# Patient Record
Sex: MALE | Race: WHITE | NOT HISPANIC OR LATINO | Employment: FULL TIME | URBAN - METROPOLITAN AREA
[De-identification: names, ages, dates, MRNs, and addresses within clinical notes are randomized per-mention and may not be internally consistent; named-entity substitution may affect disease eponyms.]

---

## 2017-08-18 ENCOUNTER — OFFICE VISIT (OUTPATIENT)
Dept: INTERNAL MEDICINE | Facility: CLINIC | Age: 37
End: 2017-08-18
Payer: COMMERCIAL

## 2017-08-18 VITALS
OXYGEN SATURATION: 96 % | HEART RATE: 78 BPM | BODY MASS INDEX: 33.41 KG/M2 | SYSTOLIC BLOOD PRESSURE: 118 MMHG | HEIGHT: 72 IN | WEIGHT: 246.69 LBS | DIASTOLIC BLOOD PRESSURE: 80 MMHG

## 2017-08-18 DIAGNOSIS — L40.9 PSORIASIS: ICD-10-CM

## 2017-08-18 DIAGNOSIS — R35.1 NOCTURIA: ICD-10-CM

## 2017-08-18 DIAGNOSIS — R79.89 LFT ELEVATION: ICD-10-CM

## 2017-08-18 DIAGNOSIS — E66.9 NON MORBID OBESITY, UNSPECIFIED OBESITY TYPE: ICD-10-CM

## 2017-08-18 DIAGNOSIS — Z00.00 PREVENTATIVE HEALTH CARE: ICD-10-CM

## 2017-08-18 DIAGNOSIS — S16.1XXA NECK STRAIN, INITIAL ENCOUNTER: Primary | ICD-10-CM

## 2017-08-18 PROCEDURE — 99999 PR PBB SHADOW E&M-EST. PATIENT-LVL III: CPT | Mod: PBBFAC,,, | Performed by: INTERNAL MEDICINE

## 2017-08-18 PROCEDURE — 3008F BODY MASS INDEX DOCD: CPT | Mod: S$GLB,,, | Performed by: INTERNAL MEDICINE

## 2017-08-18 PROCEDURE — 99214 OFFICE O/P EST MOD 30 MIN: CPT | Mod: S$GLB,,, | Performed by: INTERNAL MEDICINE

## 2017-08-18 RX ORDER — CYCLOBENZAPRINE HCL 10 MG
10 TABLET ORAL 2 TIMES DAILY PRN
Qty: 20 TABLET | Refills: 0 | Status: SHIPPED | OUTPATIENT
Start: 2017-08-18 | End: 2017-08-28

## 2017-08-18 NOTE — PROGRESS NOTES
Pt. ID: Gallito Wayne is a 37 y.o. male      Chief complaint:   Chief Complaint   Patient presents with    Torticollis     on/of x 3 mos       HPI:  Pt. Here for neck pain on and off for past 2 months; he denies injury; currently he has minimal pain with 1/10 pain; he has not taken anything for it; I reviewed labs dated 12/6/16; LFT's have normalized and hepatitis profile was negative; pt. Has gained a few pounds; he reports occassional nocturia and I advised him to limit fluid intake after 4 PM; he will come back fasting for labs and requests PSA; he has minimal psoriasis to L elbow and R knee which comes and goes; I advised him to start OTC hydrocortisone      Review of Systems   Constitutional: Negative for chills and fever.   HENT: Negative for congestion.    Respiratory: Negative for shortness of breath.    Cardiovascular: Negative for chest pain.   Gastrointestinal: Negative for abdominal pain, nausea and vomiting.   Genitourinary: Negative for dysuria.   Musculoskeletal: Positive for neck pain.        Posterior neck pain which flares on occasion; currently, minimal symptoms   Skin: Positive for rash.        intermitent rash to L elbow and R knee   Neurological: Negative for headaches.   Psychiatric/Behavioral: Negative for depression. The patient is not nervous/anxious.          Objective:    Physical Exam   Constitutional: He is oriented to person, place, and time.   obese   Eyes: EOM are normal.   Neck: Normal range of motion.   Posterior neck: good ROM with minimal pain on ROM   Cardiovascular: Normal rate, regular rhythm and normal heart sounds.    Pulmonary/Chest: Effort normal and breath sounds normal.   Abdominal: Soft. There is no tenderness. There is no rebound and no guarding.   Musculoskeletal: Normal range of motion.   Neurological: He is alert and oriented to person, place, and time.   Skin: Rash noted.   Dime size psoriatric rash to L elbow and silver dollar size area to R knee            Health Maintenance   Topic Date Due    Influenza Vaccine  08/01/2017    Lipid Panel  01/06/2021    TETANUS VACCINE  01/04/2026         Assessment:     1. Neck strain, initial encounter Well controlled   2. Nocturia Active   3. LFT elevation Well controlled   4. Psoriasis Active   5. Non morbid obesity, unspecified obesity type Sub-optimally controlled   6. Preventative health care Active         Plan: Gallito was seen today for torticollis.    Diagnoses and all orders for this visit:    Neck strain, initial encounter  Comments:  tylenol prn and start short course of flexeril prn   Orders:  -     cyclobenzaprine (FLEXERIL) 10 MG tablet; Take 1 tablet (10 mg total) by mouth 2 (two) times daily as needed for Muscle spasms (caution on sedative effects).    Nocturia  Comments:  asked pt. to limit fluid intake after 4 pm and monitor for improvement  Orders:  -     Urinalysis; Future  -     PSA, Screening; Future    LFT elevation  Comments:  monitor  Orders:  -     Comprehensive metabolic panel; Future    Psoriasis  Comments:  asked pt. to try OTC hydrocortisone cream prn     Non morbid obesity, unspecified obesity type  Comments:  encouraged diet and explained risks     Preventative health care  -     CBC auto differential; Future  -     Comprehensive metabolic panel; Future  -     Lipid panel; Future  -     TSH; Future  -     Urinalysis; Future        Problem List Items Addressed This Visit        Derm    Psoriasis       Renal/    Nocturia    Relevant Orders    Urinalysis    PSA, Screening       Endocrine    Non morbid obesity    Overview     encouraged diet and explained risks            GI    LFT elevation    Overview     repeat LFT's with hepatitis profile         Relevant Orders    Comprehensive metabolic panel       Orthopedic    Neck strain - Primary    Relevant Medications    cyclobenzaprine (FLEXERIL) 10 MG tablet       Other    Preventative health care    Relevant Orders    CBC auto differential     Comprehensive metabolic panel    Lipid panel    TSH    Urinalysis      Other Visit Diagnoses    None.

## 2017-08-19 ENCOUNTER — LAB VISIT (OUTPATIENT)
Dept: LAB | Facility: HOSPITAL | Age: 37
End: 2017-08-19
Attending: INTERNAL MEDICINE
Payer: COMMERCIAL

## 2017-08-19 DIAGNOSIS — Z00.00 PREVENTATIVE HEALTH CARE: ICD-10-CM

## 2017-08-19 DIAGNOSIS — R79.89 LFT ELEVATION: ICD-10-CM

## 2017-08-19 LAB
ALBUMIN SERPL BCP-MCNC: 3.8 G/DL
ALP SERPL-CCNC: 58 U/L
ALT SERPL W/O P-5'-P-CCNC: 21 U/L
ANION GAP SERPL CALC-SCNC: 9 MMOL/L
AST SERPL-CCNC: 21 U/L
BASOPHILS # BLD AUTO: 0.03 K/UL
BASOPHILS NFR BLD: 0.4 %
BILIRUB SERPL-MCNC: 0.4 MG/DL
BUN SERPL-MCNC: 19 MG/DL
CALCIUM SERPL-MCNC: 9.2 MG/DL
CHLORIDE SERPL-SCNC: 107 MMOL/L
CHOLEST/HDLC SERPL: 2.9 {RATIO}
CO2 SERPL-SCNC: 23 MMOL/L
CREAT SERPL-MCNC: 1.2 MG/DL
DIFFERENTIAL METHOD: NORMAL
EOSINOPHIL # BLD AUTO: 0.3 K/UL
EOSINOPHIL NFR BLD: 4.2 %
ERYTHROCYTE [DISTWIDTH] IN BLOOD BY AUTOMATED COUNT: 13.5 %
EST. GFR  (AFRICAN AMERICAN): >60 ML/MIN/1.73 M^2
EST. GFR  (NON AFRICAN AMERICAN): >60 ML/MIN/1.73 M^2
GLUCOSE SERPL-MCNC: 95 MG/DL
HCT VFR BLD AUTO: 45.3 %
HDL/CHOLESTEROL RATIO: 34.1 %
HDLC SERPL-MCNC: 182 MG/DL
HDLC SERPL-MCNC: 62 MG/DL
HGB BLD-MCNC: 15.5 G/DL
LDLC SERPL CALC-MCNC: 102 MG/DL
LYMPHOCYTES # BLD AUTO: 2.4 K/UL
LYMPHOCYTES NFR BLD: 34.6 %
MCH RBC QN AUTO: 30 PG
MCHC RBC AUTO-ENTMCNC: 34.2 G/DL
MCV RBC AUTO: 88 FL
MONOCYTES # BLD AUTO: 0.7 K/UL
MONOCYTES NFR BLD: 9.5 %
NEUTROPHILS # BLD AUTO: 3.5 K/UL
NEUTROPHILS NFR BLD: 50.9 %
NONHDLC SERPL-MCNC: 120 MG/DL
PLATELET # BLD AUTO: 260 K/UL
PMV BLD AUTO: 9.8 FL
POTASSIUM SERPL-SCNC: 4.4 MMOL/L
PROT SERPL-MCNC: 6.9 G/DL
RBC # BLD AUTO: 5.16 M/UL
SODIUM SERPL-SCNC: 139 MMOL/L
TRIGL SERPL-MCNC: 90 MG/DL
TSH SERPL DL<=0.005 MIU/L-ACNC: 2.08 UIU/ML
WBC # BLD AUTO: 6.96 K/UL

## 2017-08-19 PROCEDURE — 84443 ASSAY THYROID STIM HORMONE: CPT

## 2017-08-19 PROCEDURE — 80053 COMPREHEN METABOLIC PANEL: CPT

## 2017-08-19 PROCEDURE — 80061 LIPID PANEL: CPT

## 2017-08-19 PROCEDURE — 36415 COLL VENOUS BLD VENIPUNCTURE: CPT | Mod: PO

## 2017-08-19 PROCEDURE — 85025 COMPLETE CBC W/AUTO DIFF WBC: CPT

## 2017-08-29 ENCOUNTER — PATIENT MESSAGE (OUTPATIENT)
Dept: INTERNAL MEDICINE | Facility: CLINIC | Age: 37
End: 2017-08-29

## 2017-08-29 ENCOUNTER — TELEPHONE (OUTPATIENT)
Dept: INTERNAL MEDICINE | Facility: CLINIC | Age: 37
End: 2017-08-29

## 2017-10-06 ENCOUNTER — PATIENT MESSAGE (OUTPATIENT)
Dept: INTERNAL MEDICINE | Facility: CLINIC | Age: 37
End: 2017-10-06

## 2017-11-16 ENCOUNTER — TELEPHONE (OUTPATIENT)
Dept: INTERNAL MEDICINE | Facility: CLINIC | Age: 37
End: 2017-11-16

## 2017-12-06 ENCOUNTER — OFFICE VISIT (OUTPATIENT)
Dept: INTERNAL MEDICINE | Facility: CLINIC | Age: 37
End: 2017-12-06
Payer: COMMERCIAL

## 2017-12-06 VITALS
BODY MASS INDEX: 32.63 KG/M2 | DIASTOLIC BLOOD PRESSURE: 80 MMHG | HEIGHT: 70 IN | WEIGHT: 227.94 LBS | HEART RATE: 56 BPM | SYSTOLIC BLOOD PRESSURE: 124 MMHG

## 2017-12-06 DIAGNOSIS — Z23 NEEDS FLU SHOT: ICD-10-CM

## 2017-12-06 DIAGNOSIS — Z00.00 ROUTINE GENERAL MEDICAL EXAMINATION AT A HEALTH CARE FACILITY: Primary | ICD-10-CM

## 2017-12-06 DIAGNOSIS — E66.9 CLASS 1 OBESITY WITH BODY MASS INDEX (BMI) OF 32.0 TO 32.9 IN ADULT, UNSPECIFIED OBESITY TYPE, UNSPECIFIED WHETHER SERIOUS COMORBIDITY PRESENT: ICD-10-CM

## 2017-12-06 PROCEDURE — 90688 IIV4 VACCINE SPLT 0.5 ML IM: CPT | Mod: S$GLB,,, | Performed by: INTERNAL MEDICINE

## 2017-12-06 PROCEDURE — 90471 IMMUNIZATION ADMIN: CPT | Mod: S$GLB,,, | Performed by: INTERNAL MEDICINE

## 2017-12-06 PROCEDURE — 99999 PR PBB SHADOW E&M-EST. PATIENT-LVL III: CPT | Mod: PBBFAC,,, | Performed by: INTERNAL MEDICINE

## 2017-12-06 PROCEDURE — 99395 PREV VISIT EST AGE 18-39: CPT | Mod: 25,S$GLB,, | Performed by: INTERNAL MEDICINE

## 2017-12-06 NOTE — PROGRESS NOTES
Pt. ID: Gallito Wayne is a 37 y.o. male      Chief complaint:   Chief Complaint   Patient presents with    Annual Exam       HPI: Pt. Here for well visit; he is currently not on any meds; HR was 51 and repeat was 56; stress test dated 1/11/16 was negative; he denies symptoms; I reviewed labs dated 8/19/17 which showed no significant abnormalities; he would like a flu shot; he has lost weight      Review of Systems   Constitutional: Negative for chills and fever.   Respiratory: Negative for cough and shortness of breath.    Cardiovascular: Negative for chest pain.   Gastrointestinal: Negative for abdominal pain, nausea and vomiting.   Genitourinary: Negative for dysuria.   Psychiatric/Behavioral: Negative for depression. The patient is not nervous/anxious.          Objective:    Physical Exam   Constitutional: He is oriented to person, place, and time.   obese   Eyes: EOM are normal.   Neck: Normal range of motion.   Cardiovascular: Normal rate, regular rhythm and normal heart sounds.    Pulmonary/Chest: Effort normal and breath sounds normal.   Abdominal: Soft. There is no tenderness. There is no rebound and no guarding.   Musculoskeletal: Normal range of motion.   Neurological: He is alert and oriented to person, place, and time.   Skin: No rash noted.         Health Maintenance   Topic Date Due    Lipid Panel  08/19/2022    TETANUS VACCINE  01/04/2026    Influenza Vaccine  Completed         Assessment:     1. Routine general medical examination at a health care facility Active   2. Class 1 obesity with body mass index (BMI) of 32.0 to 32.9 in adult, unspecified obesity type, unspecified whether serious comorbidity present Sub-optimally controlled   3. Needs flu shot Active         Plan: Routine general medical examination at a health care facility    Class 1 obesity with body mass index (BMI) of 32.0 to 32.9 in adult, unspecified obesity type, unspecified whether serious comorbidity  present  Comments:  encouraged diet and explained risks     Needs flu shot  -     Influenza - Quadrivalent (3 years & older)        Problem List Items Addressed This Visit        ID    Needs flu shot    Relevant Orders    Influenza - Quadrivalent (3 years & older)       Endocrine    Class 1 obesity with body mass index (BMI) of 32.0 to 32.9 in adult    Overview     encouraged diet and explained risks            Other    Routine general medical examination at a health care facility - Primary    Overview     get screening labs

## 2018-07-01 ENCOUNTER — HOSPITAL ENCOUNTER (EMERGENCY)
Facility: HOSPITAL | Age: 38
Discharge: HOME | End: 2018-07-01
Attending: EMERGENCY MEDICINE
Payer: COMMERCIAL

## 2018-07-01 VITALS
DIASTOLIC BLOOD PRESSURE: 61 MMHG | SYSTOLIC BLOOD PRESSURE: 130 MMHG | HEART RATE: 60 BPM | RESPIRATION RATE: 18 BRPM | TEMPERATURE: 99 F | OXYGEN SATURATION: 97 %

## 2018-07-01 DIAGNOSIS — R07.9 CHEST PAIN: Primary | ICD-10-CM

## 2018-07-01 LAB
ALBUMIN SERPL BCP-MCNC: 4.2 G/DL
ALP SERPL-CCNC: 52 U/L
ALT SERPL W/O P-5'-P-CCNC: 23 U/L
ANION GAP SERPL CALC-SCNC: 11 MMOL/L
AST SERPL-CCNC: 22 U/L
BASOPHILS # BLD AUTO: 0.03 K/UL
BASOPHILS NFR BLD: 0.3 %
BILIRUB SERPL-MCNC: 0.6 MG/DL
BNP SERPL-MCNC: <10 PG/ML
BUN SERPL-MCNC: 17 MG/DL
BUN SERPL-MCNC: 19 MG/DL (ref 6–30)
CALCIUM SERPL-MCNC: 9.8 MG/DL
CHLORIDE SERPL-SCNC: 102 MMOL/L (ref 95–110)
CHLORIDE SERPL-SCNC: 103 MMOL/L
CO2 SERPL-SCNC: 23 MMOL/L
CREAT SERPL-MCNC: 1.1 MG/DL (ref 0.5–1.4)
CREAT SERPL-MCNC: 1.2 MG/DL
D DIMER PPP IA.FEU-MCNC: 0.43 MG/L FEU
DIFFERENTIAL METHOD: NORMAL
EOSINOPHIL # BLD AUTO: 0.1 K/UL
EOSINOPHIL NFR BLD: 1.6 %
ERYTHROCYTE [DISTWIDTH] IN BLOOD BY AUTOMATED COUNT: 12.6 %
EST. GFR  (AFRICAN AMERICAN): >60 ML/MIN/1.73 M^2
EST. GFR  (NON AFRICAN AMERICAN): >60 ML/MIN/1.73 M^2
GLUCOSE SERPL-MCNC: 102 MG/DL
GLUCOSE SERPL-MCNC: 104 MG/DL (ref 70–110)
HCT VFR BLD AUTO: 43.9 %
HCT VFR BLD CALC: 42 %PCV (ref 36–54)
HGB BLD-MCNC: 15.2 G/DL
IMM GRANULOCYTES # BLD AUTO: 0.02 K/UL
IMM GRANULOCYTES NFR BLD AUTO: 0.2 %
LYMPHOCYTES # BLD AUTO: 1.7 K/UL
LYMPHOCYTES NFR BLD: 19.6 %
MCH RBC QN AUTO: 30.5 PG
MCHC RBC AUTO-ENTMCNC: 34.6 G/DL
MCV RBC AUTO: 88 FL
MONOCYTES # BLD AUTO: 0.8 K/UL
MONOCYTES NFR BLD: 9 %
NEUTROPHILS # BLD AUTO: 6.1 K/UL
NEUTROPHILS NFR BLD: 69.3 %
NRBC BLD-RTO: 0 /100 WBC
PLATELET # BLD AUTO: 310 K/UL
PMV BLD AUTO: 9.6 FL
POC IONIZED CALCIUM: 1.08 MMOL/L (ref 1.06–1.42)
POC TCO2 (MEASURED): 26 MMOL/L (ref 23–29)
POTASSIUM BLD-SCNC: 3.7 MMOL/L (ref 3.5–5.1)
POTASSIUM SERPL-SCNC: 3.8 MMOL/L
PROT SERPL-MCNC: 7.2 G/DL
RBC # BLD AUTO: 4.98 M/UL
SAMPLE: NORMAL
SODIUM BLD-SCNC: 139 MMOL/L (ref 136–145)
SODIUM SERPL-SCNC: 137 MMOL/L
TROPONIN I SERPL DL<=0.01 NG/ML-MCNC: 0.01 NG/ML
TROPONIN I SERPL DL<=0.01 NG/ML-MCNC: <0.006 NG/ML
WBC # BLD AUTO: 8.77 K/UL

## 2018-07-01 PROCEDURE — 99284 EMERGENCY DEPT VISIT MOD MDM: CPT | Mod: 25

## 2018-07-01 PROCEDURE — 83880 ASSAY OF NATRIURETIC PEPTIDE: CPT

## 2018-07-01 PROCEDURE — 96374 THER/PROPH/DIAG INJ IV PUSH: CPT

## 2018-07-01 PROCEDURE — 85379 FIBRIN DEGRADATION QUANT: CPT

## 2018-07-01 PROCEDURE — 93005 ELECTROCARDIOGRAM TRACING: CPT

## 2018-07-01 PROCEDURE — 93010 ELECTROCARDIOGRAM REPORT: CPT | Mod: ,,, | Performed by: INTERNAL MEDICINE

## 2018-07-01 PROCEDURE — 63600175 PHARM REV CODE 636 W HCPCS

## 2018-07-01 PROCEDURE — 25000003 PHARM REV CODE 250

## 2018-07-01 PROCEDURE — 80053 COMPREHEN METABOLIC PANEL: CPT

## 2018-07-01 PROCEDURE — 99285 EMERGENCY DEPT VISIT HI MDM: CPT | Mod: ,,,

## 2018-07-01 PROCEDURE — 84484 ASSAY OF TROPONIN QUANT: CPT | Mod: 91

## 2018-07-01 PROCEDURE — 85025 COMPLETE CBC W/AUTO DIFF WBC: CPT

## 2018-07-01 RX ORDER — ASPIRIN 325 MG
325 TABLET ORAL
Status: COMPLETED | OUTPATIENT
Start: 2018-07-01 | End: 2018-07-01

## 2018-07-01 RX ORDER — KETOROLAC TROMETHAMINE 30 MG/ML
10 INJECTION, SOLUTION INTRAMUSCULAR; INTRAVENOUS
Status: COMPLETED | OUTPATIENT
Start: 2018-07-01 | End: 2018-07-01

## 2018-07-01 RX ADMIN — KETOROLAC TROMETHAMINE 10 MG: 30 INJECTION, SOLUTION INTRAMUSCULAR at 02:07

## 2018-07-01 RX ADMIN — ASPIRIN 325 MG ORAL TABLET 325 MG: 325 PILL ORAL at 02:07

## 2018-07-01 NOTE — DISCHARGE INSTRUCTIONS
There is no life threatening cause for your chest pain today. Please return to the ED if your pain worsens, become diaphoretic or short of breath. Recommend NSAIDs for pain control.     Our goal in the emergency department is to always give you outstanding care and exceptional service. You may receive a survey by mail or e-mail in the next week regarding your experience in our ED. We would greatly appreciate your completing and returning the survey. Your feedback provides us with a way to recognize our staff who give very good care and it helps us learn how to improve when your experience was below our aspiration of excellence.

## 2018-07-01 NOTE — ED NOTES
Patient Identifiers for Gallito Wayne checked and correct  LOC: The patient is awake, alert and aware of environment with an appropriate affect, the patient is oriented x 3 and speaking appropriate.  APPEARANCE: Patient resting comfortably and in no acute distress, patient is clean and well groomed, patient's clothing is properly fastened.  SKIN: The skin is warm and dry, patient has normal skin turgor and moist mucus membranes,no rashes or lesions.Skin Intact , No Breakdown Noted  Musculoskeletal :  Normal range of motion noted. Moves all extremeties well, No swelling or tenderness noted  RESPIRATORY: Airway is open and patent, respirations are spontaneous, patient has a normal effort and rate.  CARDIAC: Patient has a normal rate and rhythm, no periphreal edema noted, capillary refill < 3 seconds.   ABDOMEN: Soft and non tender to palpation, no distention noted.   PULSES: 2+  And symmetrical in all extremeties  NEUROLOGIC: PERRL,  facial expression is symmetrical, patient moving all extremities, normal sensation in all extremities when touched with a finger.The patient is awake, alert and cooperative with a calm affect, patient is aware of environment.    Will continue to monitor

## 2018-07-01 NOTE — ED TRIAGE NOTES
Pt reports R sided chest pain on inspiration that began around 2245, reports getting out of bed and feeling flushed/faint. Endorses some SOB. [place]

## 2018-07-01 NOTE — ED PROVIDER NOTES
Encounter Date: 7/1/2018       History     Chief Complaint   Patient presents with    Chest Pain     Pt presents to ED c/o right sided chest pain with inspiration. Reports experiencing a near syncopal episode and some SOB earlier this evening.     38 y.o. male with no significant past medical history presents to the ED for chest pain. Patient states pain occurred with acute onset at 11p. Endorses shortness of breath, lightheadedness. 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 Denies unilateral leg swelling, cough, abdominal pain, nausea, vomiting, syncope. Pain located to right lateral chest wall, pain exacerbated with deep inspiration. No change with movement or palpation.           Review of patient's allergies indicates:  No Known Allergies  Past Medical History:   Diagnosis Date    Chest pain 1/4/2016    Headache      Past Surgical History:   Procedure Laterality Date    BREAST SURGERY      Hormonal growth     Family History   Problem Relation Age of Onset    Diabetes Mother     Diabetes Father      Social History   Substance Use Topics    Smoking status: Never Smoker    Smokeless tobacco: Never Used    Alcohol use Yes      Comment: socially     Review of Systems   Constitutional: Negative for chills, diaphoresis, fatigue and fever.   HENT: Negative for congestion.    Eyes: Negative for visual disturbance.   Respiratory: Positive for shortness of breath. Negative for cough.    Cardiovascular: Positive for chest pain. Negative for leg swelling.   Gastrointestinal: Negative for abdominal distention, abdominal pain, nausea and vomiting.   Genitourinary: Negative for dysuria and hematuria.   Musculoskeletal: Negative for back pain and neck pain.   Skin: Negative for rash.   Neurological: Positive for light-headedness. Negative for syncope, weakness and headaches.   Psychiatric/Behavioral: The patient is not nervous/anxious.        Physical Exam     Initial Vitals [07/01/18  0109]   BP Pulse Resp Temp SpO2   (!) 150/88 82 18 98.5 °F (36.9 °C) 96 %      MAP       --         Physical Exam    Vitals reviewed.  Constitutional: He appears well-developed and well-nourished. He is not diaphoretic. No distress.   HENT:   Head: Normocephalic and atraumatic.   Eyes: Conjunctivae and EOM are normal. Pupils are equal, round, and reactive to light.   Cardiovascular: Normal rate, regular rhythm and intact distal pulses.   Pulmonary/Chest: Breath sounds normal. He has no wheezes. He has no rhonchi. He has no rales.   Abdominal: Soft. Bowel sounds are normal. There is no tenderness. There is no rebound and no guarding.   Musculoskeletal: Normal range of motion.   Neurological: He is alert. No sensory deficit.   Skin: Skin is warm. Capillary refill takes less than 2 seconds. No rash noted.   Psychiatric: He has a normal mood and affect.         ED Course   Procedures  Labs Reviewed   COMPREHENSIVE METABOLIC PANEL - Abnormal; Notable for the following:        Result Value    Alkaline Phosphatase 52 (*)     All other components within normal limits   CBC W/ AUTO DIFFERENTIAL   TROPONIN I   TROPONIN I   B-TYPE NATRIURETIC PEPTIDE   D DIMER, QUANTITATIVE   ISTAT PROCEDURE   ISTAT CHEM8          Imaging Results          X-Ray Chest PA And Lateral (Final result)  Result time 07/01/18 02:09:50    Final result by Rudy Rodríguez MD (07/01/18 02:09:50)                 Impression:      No acute cardiopulmonary process.      Electronically signed by: Rudy Rodríguez MD  Date:    07/01/2018  Time:    02:09             Narrative:    EXAMINATION:  XR CHEST PA AND LATERAL    CLINICAL HISTORY:  Chest Pain;    TECHNIQUE:  PA and lateral views of the chest were performed.    COMPARISON:  September 22, 2016.    FINDINGS:  There is no consolidation, effusion, or pneumothorax.    Cardiomediastinal silhouette is unremarkable.    Regional osseous structures are unremarkable.                                 Medical Decision  Making:   History:   Old Medical Records: I decided to obtain old medical records.  Old Records Summarized: records from clinic visits.  Initial Assessment:   38 y.o. male with no significant past medical history presents to the ED for chest pain that began around 11p. Reports lightheadedness and shortness of breath. Pain worse with deep inspiration.   Differential Diagnosis:   DDx includes but is not limited to ACS, PE, pleurisy, electrolyte abnormality, musculoskeletal etiology.   Clinical Tests:   Lab Tests: Ordered and Reviewed  Radiological Study: Ordered and Reviewed  Medical Tests: Ordered and Reviewed  ED Management:  Will get cardiac labs, D dimer, CXR and EKG.     D dimer benign at .43. 1st troponin negative, <.006. CXR shows no acute cardiopulmonary process. All other labs benign. Pain relieved with toradol. 2nd trop scheduled for 4:30. 2nd troponin negative at .009. Will discharge home and recommend NSAID use for pain control. Discharged to home in stable condition, return to ED warnings given, follow up and patient care instructions given.      I have discussed the treatment and management of this patient with my supervisory physician, and we agree on the plan of care.                         Clinical Impression:   The encounter diagnosis was Chest pain.      Disposition:   Disposition: Discharged  Condition: Stable                        Chacha Luciano PA-C  07/01/18 [id number]

## 2018-07-10 ENCOUNTER — OFFICE VISIT (OUTPATIENT)
Dept: INTERNAL MEDICINE | Facility: CLINIC | Age: 38
End: 2018-07-10
Payer: COMMERCIAL

## 2018-07-10 VITALS
BODY MASS INDEX: 33.17 KG/M2 | HEIGHT: 70 IN | HEART RATE: 65 BPM | WEIGHT: 231.69 LBS | SYSTOLIC BLOOD PRESSURE: 130 MMHG | OXYGEN SATURATION: 97 % | DIASTOLIC BLOOD PRESSURE: 80 MMHG

## 2018-07-10 DIAGNOSIS — E66.9 CLASS 1 OBESITY WITHOUT SERIOUS COMORBIDITY WITH BODY MASS INDEX (BMI) OF 33.0 TO 33.9 IN ADULT, UNSPECIFIED OBESITY TYPE: ICD-10-CM

## 2018-07-10 DIAGNOSIS — Z00.00 PREVENTATIVE HEALTH CARE: ICD-10-CM

## 2018-07-10 DIAGNOSIS — M54.50 ACUTE RIGHT-SIDED LOW BACK PAIN WITHOUT SCIATICA: Primary | ICD-10-CM

## 2018-07-10 PROCEDURE — 99999 PR PBB SHADOW E&M-EST. PATIENT-LVL III: CPT | Mod: PBBFAC,,, | Performed by: INTERNAL MEDICINE

## 2018-07-10 PROCEDURE — 99213 OFFICE O/P EST LOW 20 MIN: CPT | Mod: S$GLB,,, | Performed by: INTERNAL MEDICINE

## 2018-07-10 NOTE — PROGRESS NOTES
Pt. ID: Gallito Wayne is a 38 y.o. male      Chief complaint:   Chief Complaint   Patient presents with    Back Pain       HPI: Pt. Here for R low back pain for past 1 week; he states pain is not bothersome today and advil prn helps;  of note, pt. Went to ER for chest pain; CE was negative x 2; stress test dated 1/11/16 was negative for ischemia; he denies any further chest pain; he denies GERD or anxiety; he has gained a few pounds      Review of Systems   Constitutional: Negative for chills and fever.   Respiratory: Negative for cough and shortness of breath.    Cardiovascular: Negative for chest pain.   Gastrointestinal: Negative for abdominal pain, nausea and vomiting.   Genitourinary: Negative for dysuria.   Musculoskeletal: Positive for back pain.        R low back pain which is helped with advil prn; he is currently pain free         Objective:    Physical Exam   Constitutional: He is oriented to person, place, and time.   obese   Eyes: EOM are normal.   Neck: Normal range of motion.   Cardiovascular: Normal rate, regular rhythm and normal heart sounds.    Pulmonary/Chest: Effort normal and breath sounds normal.   Abdominal: Soft. There is no tenderness. There is no rebound and no guarding.   Musculoskeletal: Normal range of motion.   Good ROM of low back without pain or tenderness   Neurological: He is alert and oriented to person, place, and time.   Skin: No rash noted.   Vitals reviewed.        Health Maintenance   Topic Date Due    Influenza Vaccine  08/01/2018    Lipid Panel  08/19/2022    TETANUS VACCINE  01/04/2026         Assessment:     1. Acute right-sided low back pain without sciatica Well controlled   2. Class 1 obesity without serious comorbidity with body mass index (BMI) of 33.0 to 33.9 in adult, unspecified obesity type Sub-optimally controlled   3. Preventative health care Active         Plan: Acute right-sided low back pain without sciatica  Comments:  asymptomatic; advil prn; pt.  will call for lumbar xray if symptoms are repetative    Class 1 obesity without serious comorbidity with body mass index (BMI) of 33.0 to 33.9 in adult, unspecified obesity type  Comments:  encouraged diet and explained risks     Preventative health care  -     CBC auto differential; Future; Expected date: 01/10/2019  -     Comprehensive metabolic panel; Future; Expected date: 01/10/2019  -     Lipid panel; Future; Expected date: 01/10/2019  -     TSH; Future; Expected date: 01/10/2019        Problem List Items Addressed This Visit        Endocrine    Class 1 obesity without serious comorbidity with body mass index (BMI) of 33.0 to 33.9 in adult    Overview     encouraged diet and explained risks            Orthopedic    Acute right-sided low back pain without sciatica - Primary       Other    Preventative health care    Relevant Orders    CBC auto differential    Comprehensive metabolic panel    Lipid panel    TSH

## 2018-09-16 ENCOUNTER — PATIENT MESSAGE (OUTPATIENT)
Dept: INTERNAL MEDICINE | Facility: CLINIC | Age: 38
End: 2018-09-16

## 2018-09-17 ENCOUNTER — TELEPHONE (OUTPATIENT)
Dept: INTERNAL MEDICINE | Facility: CLINIC | Age: 38
End: 2018-09-17

## 2018-09-17 NOTE — TELEPHONE ENCOUNTER
Attempted to call pt regarding ho email wanted to be seen today due to URI sx. Left message to call [phone].

## 2018-12-26 ENCOUNTER — OFFICE VISIT (OUTPATIENT)
Dept: INTERNAL MEDICINE | Facility: CLINIC | Age: 38
End: 2018-12-26
Payer: COMMERCIAL

## 2018-12-26 ENCOUNTER — TELEPHONE (OUTPATIENT)
Dept: FAMILY MEDICINE | Facility: CLINIC | Age: 38
End: 2018-12-26

## 2018-12-26 ENCOUNTER — PATIENT MESSAGE (OUTPATIENT)
Dept: INTERNAL MEDICINE | Facility: CLINIC | Age: 38
End: 2018-12-26

## 2018-12-26 VITALS
OXYGEN SATURATION: 95 % | TEMPERATURE: 98 F | SYSTOLIC BLOOD PRESSURE: 124 MMHG | BODY MASS INDEX: 38.06 KG/M2 | WEIGHT: 265.88 LBS | HEIGHT: 70 IN | DIASTOLIC BLOOD PRESSURE: 80 MMHG | HEART RATE: 68 BPM

## 2018-12-26 DIAGNOSIS — Z00.00 ENCOUNTER FOR ANNUAL PHYSICAL EXAM: Primary | ICD-10-CM

## 2018-12-26 DIAGNOSIS — E66.01 CLASS 2 SEVERE OBESITY WITH SERIOUS COMORBIDITY AND BODY MASS INDEX (BMI) OF 38.0 TO 38.9 IN ADULT, UNSPECIFIED OBESITY TYPE: ICD-10-CM

## 2018-12-26 PROCEDURE — 99395 PREV VISIT EST AGE 18-39: CPT | Mod: S$GLB,,, | Performed by: INTERNAL MEDICINE

## 2018-12-26 PROCEDURE — 99999 PR PBB SHADOW E&M-EST. PATIENT-LVL III: CPT | Mod: PBBFAC,,, | Performed by: INTERNAL MEDICINE

## 2018-12-26 NOTE — PROGRESS NOTES
Pt. ID: Gallito Wayne is a 38 y.o. male      Chief complaint:   Chief Complaint   Patient presents with    Annual Exam       HPI: Pt. Here for physical form to be filled out; he will come back fasting for labs; he states he feels fine; he has gained weight       Review of Systems   Constitutional: Negative for chills and fever.   Respiratory: Negative for cough and shortness of breath.    Cardiovascular: Negative for chest pain.   Gastrointestinal: Negative for abdominal pain, nausea and vomiting.   Genitourinary: Negative for dysuria.         Objective:    Physical Exam   Constitutional: He is oriented to person, place, and time.   obese   Eyes: EOM are normal.   Neck: Normal range of motion.   Cardiovascular: Normal rate, regular rhythm and normal heart sounds.   Pulmonary/Chest: Effort normal and breath sounds normal.   Abdominal: Soft. There is no tenderness. There is no rebound and no guarding.   Musculoskeletal: Normal range of motion.   Neurological: He is alert and oriented to person, place, and time.   Skin: No rash noted.   Vitals reviewed.        Health Maintenance   Topic Date Due    Lipid Panel  08/19/2022    TETANUS VACCINE  01/04/2026    Influenza Vaccine  Completed         Assessment:     1. Encounter for annual physical exam Active   2. Class 2 severe obesity with serious comorbidity and body mass index (BMI) of 38.0 to 38.9 in adult, unspecified obesity type Sub-optimally controlled         Plan: Encounter for annual physical exam  Comments:  get labs as requested by form    Class 2 severe obesity with serious comorbidity and body mass index (BMI) of 38.0 to 38.9 in adult, unspecified obesity type  Comments:  encouraged diet and explained risks         Problem List Items Addressed This Visit        Other    Class 2 severe obesity with serious comorbidity and body mass index (BMI) of 38.0 to 38.9 in adult    Overview     encouraged diet and explained risks         Encounter for annual  physical exam - Primary

## 2018-12-27 ENCOUNTER — LAB VISIT (OUTPATIENT)
Dept: LAB | Facility: HOSPITAL | Age: 38
End: 2018-12-27
Attending: INTERNAL MEDICINE
Payer: COMMERCIAL

## 2018-12-27 DIAGNOSIS — Z00.00 PREVENTATIVE HEALTH CARE: ICD-10-CM

## 2018-12-27 LAB
ALBUMIN SERPL BCP-MCNC: 3.8 G/DL
ALP SERPL-CCNC: 53 U/L
ALT SERPL W/O P-5'-P-CCNC: 50 U/L
ANION GAP SERPL CALC-SCNC: 8 MMOL/L
AST SERPL-CCNC: 33 U/L
BASOPHILS # BLD AUTO: 0.05 K/UL
BASOPHILS NFR BLD: 0.7 %
BILIRUB SERPL-MCNC: 0.6 MG/DL
BUN SERPL-MCNC: 13 MG/DL
CALCIUM SERPL-MCNC: 9 MG/DL
CHLORIDE SERPL-SCNC: 105 MMOL/L
CHOLEST SERPL-MCNC: 207 MG/DL
CHOLEST/HDLC SERPL: 3.6 {RATIO}
CO2 SERPL-SCNC: 26 MMOL/L
CREAT SERPL-MCNC: 1.1 MG/DL
DIFFERENTIAL METHOD: ABNORMAL
EOSINOPHIL # BLD AUTO: 0.3 K/UL
EOSINOPHIL NFR BLD: 4.4 %
ERYTHROCYTE [DISTWIDTH] IN BLOOD BY AUTOMATED COUNT: 13.3 %
EST. GFR  (AFRICAN AMERICAN): >60 ML/MIN/1.73 M^2
EST. GFR  (NON AFRICAN AMERICAN): >60 ML/MIN/1.73 M^2
GLUCOSE SERPL-MCNC: 97 MG/DL
HCT VFR BLD AUTO: 45.5 %
HDLC SERPL-MCNC: 57 MG/DL
HDLC SERPL: 27.5 %
HGB BLD-MCNC: 14.8 G/DL
IMM GRANULOCYTES # BLD AUTO: 0.05 K/UL
IMM GRANULOCYTES NFR BLD AUTO: 0.7 %
LDLC SERPL CALC-MCNC: 123 MG/DL
LYMPHOCYTES # BLD AUTO: 2.3 K/UL
LYMPHOCYTES NFR BLD: 30.3 %
MCH RBC QN AUTO: 29 PG
MCHC RBC AUTO-ENTMCNC: 32.5 G/DL
MCV RBC AUTO: 89 FL
MONOCYTES # BLD AUTO: 0.8 K/UL
MONOCYTES NFR BLD: 10.3 %
NEUTROPHILS # BLD AUTO: 4 K/UL
NEUTROPHILS NFR BLD: 53.6 %
NONHDLC SERPL-MCNC: 150 MG/DL
NRBC BLD-RTO: 0 /100 WBC
PLATELET # BLD AUTO: 308 K/UL
PMV BLD AUTO: 10.1 FL
POTASSIUM SERPL-SCNC: 4.1 MMOL/L
PROT SERPL-MCNC: 7.1 G/DL
RBC # BLD AUTO: 5.1 M/UL
SODIUM SERPL-SCNC: 139 MMOL/L
TRIGL SERPL-MCNC: 135 MG/DL
TSH SERPL DL<=0.005 MIU/L-ACNC: 1.65 UIU/ML
WBC # BLD AUTO: 7.45 K/UL

## 2018-12-27 PROCEDURE — 85025 COMPLETE CBC W/AUTO DIFF WBC: CPT

## 2018-12-27 PROCEDURE — 80061 LIPID PANEL: CPT

## 2018-12-27 PROCEDURE — 36415 COLL VENOUS BLD VENIPUNCTURE: CPT | Mod: PO

## 2018-12-27 PROCEDURE — 84443 ASSAY THYROID STIM HORMONE: CPT

## 2018-12-27 PROCEDURE — 80053 COMPREHEN METABOLIC PANEL: CPT

## 2018-12-28 ENCOUNTER — TELEPHONE (OUTPATIENT)
Dept: FAMILY MEDICINE | Facility: CLINIC | Age: 38
End: 2018-12-28

## 2018-12-28 ENCOUNTER — PATIENT MESSAGE (OUTPATIENT)
Dept: INTERNAL MEDICINE | Facility: CLINIC | Age: 38
End: 2018-12-28

## 2018-12-28 ENCOUNTER — TELEPHONE (OUTPATIENT)
Dept: INTERNAL MEDICINE | Facility: CLINIC | Age: 38
End: 2018-12-28

## 2018-12-28 DIAGNOSIS — R79.89 LFT ELEVATION: Primary | ICD-10-CM

## 2018-12-28 NOTE — TELEPHONE ENCOUNTER
Physical form is complete, please fax back; notify pt. Cholesterol is mildly elevated; have pt. Lower cholesterol in the diet; LFT is mildly elevated; repeat LFT's with hepatitis profile in 1 month

## 2018-12-28 NOTE — TELEPHONE ENCOUNTER
Informed pt paper is placed to Dr Garcia's desk and he will review them after clinic hours.informed I will call him back as soon as papers are filled out. He voices understanding.

## 2018-12-31 NOTE — TELEPHONE ENCOUNTER
Spoke with pt given test result and recommendation by Dr Garcia. Scheduled labs, pt voices understanding.

## 2019-01-08 ENCOUNTER — OFFICE VISIT (OUTPATIENT)
Dept: UROLOGY | Facility: CLINIC | Age: 39
End: 2019-01-08
Payer: COMMERCIAL

## 2019-01-08 VITALS
SYSTOLIC BLOOD PRESSURE: 135 MMHG | HEART RATE: 66 BPM | DIASTOLIC BLOOD PRESSURE: 84 MMHG | BODY MASS INDEX: 37.94 KG/M2 | WEIGHT: 265 LBS | HEIGHT: 70 IN

## 2019-01-08 DIAGNOSIS — R39.15 URINARY URGENCY: ICD-10-CM

## 2019-01-08 DIAGNOSIS — R35.0 URINARY FREQUENCY: Primary | ICD-10-CM

## 2019-01-08 DIAGNOSIS — R35.1 NOCTURIA: ICD-10-CM

## 2019-01-08 LAB
BILIRUB UR QL STRIP: NEGATIVE
CLARITY UR REFRACT.AUTO: CLEAR
COLOR UR AUTO: ABNORMAL
GLUCOSE UR QL STRIP: NEGATIVE
HGB UR QL STRIP: NEGATIVE
KETONES UR QL STRIP: NEGATIVE
LEUKOCYTE ESTERASE UR QL STRIP: ABNORMAL
MICROSCOPIC COMMENT: NORMAL
NITRITE UR QL STRIP: NEGATIVE
PH UR STRIP: 7 [PH] (ref 5–8)
PROT UR QL STRIP: NEGATIVE
RBC #/AREA URNS AUTO: 1 /HPF (ref 0–4)
SP GR UR STRIP: 1.01 (ref 1–1.03)
SQUAMOUS #/AREA URNS AUTO: 0 /HPF
URN SPEC COLLECT METH UR: ABNORMAL
WBC #/AREA URNS AUTO: 0 /HPF (ref 0–5)

## 2019-01-08 PROCEDURE — 51798 PR MEAS,POST-VOID RES,US,NON-IMAGING: ICD-10-PCS | Mod: S$GLB,,, | Performed by: STUDENT IN AN ORGANIZED HEALTH CARE EDUCATION/TRAINING PROGRAM

## 2019-01-08 PROCEDURE — 3008F BODY MASS INDEX DOCD: CPT | Mod: CPTII,S$GLB,, | Performed by: STUDENT IN AN ORGANIZED HEALTH CARE EDUCATION/TRAINING PROGRAM

## 2019-01-08 PROCEDURE — 99204 OFFICE O/P NEW MOD 45 MIN: CPT | Mod: 25,S$GLB,, | Performed by: STUDENT IN AN ORGANIZED HEALTH CARE EDUCATION/TRAINING PROGRAM

## 2019-01-08 PROCEDURE — 81002 PR URINALYSIS NONAUTO W/O SCOPE: ICD-10-PCS | Mod: S$GLB,,, | Performed by: STUDENT IN AN ORGANIZED HEALTH CARE EDUCATION/TRAINING PROGRAM

## 2019-01-08 PROCEDURE — 81002 URINALYSIS NONAUTO W/O SCOPE: CPT | Mod: S$GLB,,, | Performed by: STUDENT IN AN ORGANIZED HEALTH CARE EDUCATION/TRAINING PROGRAM

## 2019-01-08 PROCEDURE — 99999 PR PBB SHADOW E&M-EST. PATIENT-LVL III: ICD-10-PCS | Mod: PBBFAC,,, | Performed by: STUDENT IN AN ORGANIZED HEALTH CARE EDUCATION/TRAINING PROGRAM

## 2019-01-08 PROCEDURE — 51798 US URINE CAPACITY MEASURE: CPT | Mod: S$GLB,,, | Performed by: STUDENT IN AN ORGANIZED HEALTH CARE EDUCATION/TRAINING PROGRAM

## 2019-01-08 PROCEDURE — 3008F PR BODY MASS INDEX (BMI) DOCUMENTED: ICD-10-PCS | Mod: CPTII,S$GLB,, | Performed by: STUDENT IN AN ORGANIZED HEALTH CARE EDUCATION/TRAINING PROGRAM

## 2019-01-08 PROCEDURE — 87086 URINE CULTURE/COLONY COUNT: CPT

## 2019-01-08 PROCEDURE — 81001 URINALYSIS AUTO W/SCOPE: CPT

## 2019-01-08 PROCEDURE — 99204 PR OFFICE/OUTPT VISIT, NEW, LEVL IV, 45-59 MIN: ICD-10-PCS | Mod: 25,S$GLB,, | Performed by: STUDENT IN AN ORGANIZED HEALTH CARE EDUCATION/TRAINING PROGRAM

## 2019-01-08 PROCEDURE — 99999 PR PBB SHADOW E&M-EST. PATIENT-LVL III: CPT | Mod: PBBFAC,,, | Performed by: STUDENT IN AN ORGANIZED HEALTH CARE EDUCATION/TRAINING PROGRAM

## 2019-01-08 NOTE — PROGRESS NOTES
Subjective:       Patient ID: Gallito Wayne is a 38 y.o. male.    Chief Complaint: urinary frequency  This is a 38 y.o.  male patient that is new to me.  The patient is self referred to me for worsening urinary frequency and urgency over the past year. He has noticed an increase in his urinary frequency and decided to get an evaluation. He notes no significant changes during the day but moreso at night. He notes some mild hesitancy in the beginning of his stream. He has a good stream. He urinates without straining. He denies any urethral discharge or trauma to penile area or groin.   DTF - every 3-4 hours, but does note some urgency during the day.   NTF - 6x/night. Feels an urge and wakes up to go urinate, small volume voids.  At his job he does have access to bathroom. Sometimes he postpones urination.   Hydration - drinks water, has a water fountain right outside of office, fills up a large cup = several times a day 60 oz cup. offshore 3 cups of coffee. At home less coffee intake.   Unprotected sexual activity - only with significant other/wife.  Bowel movements = sometimes will skip a day, not often. Sometimes train/pushes.     History of prostate stones in the past. Saw Dr. Stringer. This was performed for hematospermia.     Works offshore as a paramedic. Recently was in [facility]. His schedule is 28 days on and then 28 days off.   POCT urinalysis - benign  PVR - 2cc.    Lab Results   Component Value Date    CREATININE 1.1 12/27/2018     ---  Past Medical History:   Diagnosis Date    Chest pain 1/4/2016    Headache        Past Surgical History:   Procedure Laterality Date    BREAST SURGERY      Hormonal growth       Family History   Problem Relation Age of Onset    Diabetes Mother     Diabetes Father     Prostate cancer Neg Hx     Kidney disease Neg Hx        Social History     Tobacco Use    Smoking status: Never Smoker    Smokeless tobacco: Never Used   Substance Use Topics    Alcohol use: Yes      Comment: socially    Drug use: No       Current Outpatient Medications on File Prior to Visit   Medication Sig Dispense Refill    FLUZONE QUAD [id number], PF, 60 mcg (15 mcg x 4)/0.5 mL Syrg TO BE ADMINISTERED BY PHARMACIST FOR IMMUNIZATION  0     No current facility-administered medications on file prior to visit.        Review of patient's allergies indicates:  No Known Allergies    Review of Systems   Constitutional: Negative for chills.   HENT: Negative for congestion.    Eyes: Negative for visual disturbance.   Respiratory: Negative for shortness of breath.    Cardiovascular: Negative for chest pain.   Gastrointestinal: Negative for abdominal distention.   Musculoskeletal: Negative for gait problem.   Skin: Negative for color change.   Neurological: Negative for dizziness.   Psychiatric/Behavioral: Negative for agitation.       Objective:      Physical Exam   Constitutional: He appears well-developed and well-nourished.   HENT:   Head: Normocephalic.   Eyes: Pupils are equal, round, and reactive to light.   Neck: Normal range of motion.   Cardiovascular: Intact distal pulses.   Pulmonary/Chest: Effort normal.   Abdominal: Soft. He exhibits no distension. There is no tenderness.   No incisions   Genitourinary:   Genitourinary Comments: Circumcised phallus, normal orthotopic meatus and glans. No penile rashes or lesions.  Bilaterally descended testicles, nontender, no hydroceles or masses detected. Bilaterally palpable vasa.   Rectal examination - no hemorrhoids, skin irritation noted around anus.  JIE - nonboggy, small prostate, 20-25g no hard nodules, benign gland   Musculoskeletal: Normal range of motion.   Neurological: He is alert.   Skin: Skin is warm and dry.   Psychiatric: He has a normal mood and affect.       Assessment:       1. Urinary frequency    2. Nocturia    3. Urinary urgency        Plan:       38 y.o. male with urinary urgency, frequency, and nocturia. His urinary frequency and urgency  symptoms may be secondary to his fluid intake, or bladder irritants intake. His urgency component may be due to his constipation. Also counseled him that weight loss is associated with an improvement in bothersome lower urinary tract symptoms on recent studies however the exact mechanism is not known. PVR 2cc, counseled patient he is emptying well.     1. Avoid bladder irritants including but not limited to caffeine, alcohol, smoking, spicy foods, acidic foods, tomato-based products, citrus, artificial sweeteners, chocolate, coffee or tea.  2. Stool softeners, fibers to try to achieve a goal of 1 soft daily BM.   3. Will send urine for urinalysis and culture and GC/CT PCR to rule out any other etiology of symptoms. Counseled patient I am expecting these results to return back as benign.  4. Diet/exercise, weight loss.  5. Limit hydration/fluids 2-3 hours before going to bed.  6. If no improvement in the next few months after utilization of above measures, consider oxybutynin as nocturia seems to be his most bothersome symptom.     Urinary frequency  -     C. trachomatis/N. gonorrhoeae by AMP DNA  -     Urinalysis  -     Urinalysis Microscopic  -     Urine culture  -     POCT Bladder Scan  -     POCT urine dipstick without microscope    Nocturia  -     C. trachomatis/N. gonorrhoeae by AMP DNA  -     Urinalysis  -     Urinalysis Microscopic  -     Urine culture  -     POCT Bladder Scan  -     POCT urine dipstick without microscope    Urinary urgency  -     POCT Bladder Scan  -     POCT urine dipstick without microscope

## 2019-01-09 LAB — BACTERIA UR CULT: NO GROWTH

## 2019-04-30 ENCOUNTER — TELEPHONE (OUTPATIENT)
Dept: FAMILY MEDICINE | Facility: CLINIC | Age: 39
End: 2019-04-30

## 2019-04-30 ENCOUNTER — PATIENT MESSAGE (OUTPATIENT)
Dept: INTERNAL MEDICINE | Facility: CLINIC | Age: 39
End: 2019-04-30

## 2019-05-03 ENCOUNTER — LAB VISIT (OUTPATIENT)
Dept: LAB | Facility: HOSPITAL | Age: 39
End: 2019-05-03
Attending: INTERNAL MEDICINE
Payer: COMMERCIAL

## 2019-05-03 DIAGNOSIS — R79.89 LFT ELEVATION: ICD-10-CM

## 2019-05-03 LAB
ALBUMIN SERPL BCP-MCNC: 3.8 G/DL (ref 3.5–5.2)
ALP SERPL-CCNC: 58 U/L (ref 55–135)
ALT SERPL W/O P-5'-P-CCNC: 32 U/L (ref 10–44)
AST SERPL-CCNC: 22 U/L (ref 10–40)
BILIRUB DIRECT SERPL-MCNC: 0.2 MG/DL (ref 0.1–0.3)
BILIRUB SERPL-MCNC: 0.6 MG/DL (ref 0.1–1)
HAV IGM SERPL QL IA: NEGATIVE
HBV CORE IGM SERPL QL IA: NEGATIVE
HBV SURFACE AG SERPL QL IA: NEGATIVE
HCV AB SERPL QL IA: NEGATIVE
PROT SERPL-MCNC: 6.8 G/DL (ref 6–8.4)

## 2019-05-03 PROCEDURE — 80076 HEPATIC FUNCTION PANEL: CPT

## 2019-05-03 PROCEDURE — 80074 ACUTE HEPATITIS PANEL: CPT

## 2019-05-03 PROCEDURE — 36415 COLL VENOUS BLD VENIPUNCTURE: CPT | Mod: PO

## 2019-05-07 ENCOUNTER — PATIENT MESSAGE (OUTPATIENT)
Dept: FAMILY MEDICINE | Facility: CLINIC | Age: 39
End: 2019-05-07

## 2019-06-18 ENCOUNTER — OFFICE VISIT (OUTPATIENT)
Dept: INTERNAL MEDICINE | Facility: CLINIC | Age: 39
End: 2019-06-18
Payer: COMMERCIAL

## 2019-06-18 VITALS
OXYGEN SATURATION: 97 % | RESPIRATION RATE: 18 BRPM | HEART RATE: 94 BPM | WEIGHT: 241.13 LBS | HEIGHT: 70 IN | BODY MASS INDEX: 34.52 KG/M2

## 2019-06-18 DIAGNOSIS — J01.00 ACUTE NON-RECURRENT MAXILLARY SINUSITIS: Primary | ICD-10-CM

## 2019-06-18 DIAGNOSIS — H66.001 ACUTE SUPPURATIVE OTITIS MEDIA OF RIGHT EAR WITHOUT SPONTANEOUS RUPTURE OF TYMPANIC MEMBRANE, RECURRENCE NOT SPECIFIED: ICD-10-CM

## 2019-06-18 PROCEDURE — 3008F PR BODY MASS INDEX (BMI) DOCUMENTED: ICD-10-PCS | Mod: CPTII,S$GLB,, | Performed by: INTERNAL MEDICINE

## 2019-06-18 PROCEDURE — 3008F BODY MASS INDEX DOCD: CPT | Mod: CPTII,S$GLB,, | Performed by: INTERNAL MEDICINE

## 2019-06-18 PROCEDURE — 99999 PR PBB SHADOW E&M-EST. PATIENT-LVL III: CPT | Mod: PBBFAC,,, | Performed by: INTERNAL MEDICINE

## 2019-06-18 PROCEDURE — 99999 PR PBB SHADOW E&M-EST. PATIENT-LVL III: ICD-10-PCS | Mod: PBBFAC,,, | Performed by: INTERNAL MEDICINE

## 2019-06-18 PROCEDURE — 99213 OFFICE O/P EST LOW 20 MIN: CPT | Mod: S$GLB,,, | Performed by: INTERNAL MEDICINE

## 2019-06-18 PROCEDURE — 99213 PR OFFICE/OUTPT VISIT, EST, LEVL III, 20-29 MIN: ICD-10-PCS | Mod: S$GLB,,, | Performed by: INTERNAL MEDICINE

## 2019-06-18 RX ORDER — CETIRIZINE HYDROCHLORIDE 10 MG/1
10 TABLET ORAL DAILY
COMMUNITY

## 2019-06-18 RX ORDER — LEVOFLOXACIN 500 MG/1
500 TABLET, FILM COATED ORAL DAILY
Qty: 10 TABLET | Refills: 0 | Status: SHIPPED | OUTPATIENT
Start: 2019-06-18 | End: 2019-06-28

## 2019-06-25 NOTE — PROGRESS NOTES
INTERNAL MEDICINE    Patient Active Problem List   Diagnosis    Chronic daily headache    Migraine without aura, without mention of intractable migraine without mention of status migrainosus    Urinary frequency    Lower urinary tract symptoms (LUTS)    Hypercholesteremia    Class 2 severe obesity with serious comorbidity and body mass index (BMI) of 38.0 to 38.9 in adult    Chest pain    Gastroesophageal reflux disease    Need for tetanus booster    Needs flu shot    Overweight (BMI 25.0-29.9)    Abnormal CXR    LFT elevation    Neck strain    Nocturia    Psoriasis    Acute right-sided low back pain without sciatica       CC:   Chief Complaint   Patient presents with    Otalgia     right ear    Cough    Sinus Problem       SUBJECTIVE:  Gallito Wayne   is a 39 y.o. male  Otalgia    There is pain in the right ear. This is a new problem. The current episode started in the past 7 days. The problem occurs constantly. The problem has been unchanged. There has been no fever. The pain is at a severity of 5/10. The pain is mild. Pertinent negatives include no headaches, hearing loss, neck pain or rash. He has tried nothing for the symptoms. There is no history of a tympanostomy tube.      39y/oWM with right ear pain:      ROS: Review of Systems   HENT: Positive for ear pain. Negative for congestion, dental problem and hearing loss.    Respiratory: Negative.    Cardiovascular: Negative.    Musculoskeletal: Negative for neck pain.   Skin: Negative for rash.   Neurological: Negative for syncope, light-headedness, numbness and headaches.       Past Medical History:   Diagnosis Date    Chest pain 1/4/2016    Headache        Past Surgical History:   Procedure Laterality Date    BREAST SURGERY      Hormonal growth       Family History   Problem Relation Age of Onset    Diabetes Mother     Diabetes Father     Prostate cancer Neg Hx     Kidney disease Neg Hx        Social History     Tobacco Use     "Smoking status: Never Smoker    Smokeless tobacco: Never Used   Substance Use Topics    Alcohol use: Yes     Comment: socially    Drug use: No       Social History     Social History Narrative    Not on file       ALLERGIES: Review of patient's allergies indicates:  No Known Allergies    MEDS:   Current Outpatient Medications:     cetirizine (ZYRTEC) 10 MG tablet, Take 10 mg by mouth once daily., Disp: , Rfl:     levoFLOXacin (LEVAQUIN) 500 MG tablet, Take 1 tablet (500 mg total) by mouth once daily. for 10 days, Disp: 10 tablet, Rfl: 0    OBJECTIVE:   Vitals:    06/18/19 1541   Pulse: 94   Resp: 18   SpO2: 97%   Weight: 109.4 kg (241 lb 1.6 oz)   Height: 5' 10" (1.778 m)     Body mass index is 34.59 kg/m².    Physical Exam   Constitutional: He is oriented to person, place, and time. He appears well-developed and well-nourished.   HENT:   Head: Normocephalic and atraumatic.   Right Ear: External ear and ear canal normal. Tympanic membrane is injected and retracted. A middle ear effusion is present.   Left Ear: External ear and ear canal normal.   Nose: Mucosal edema present. Right sinus exhibits maxillary sinus tenderness.   Eyes: Pupils are equal, round, and reactive to light. Conjunctivae are normal.   Neck: Neck supple.   Cardiovascular: Normal rate, regular rhythm and normal heart sounds.   Pulmonary/Chest: Effort normal and breath sounds normal.   Musculoskeletal: Normal range of motion.   Lymphadenopathy:     He has no cervical adenopathy.   Neurological: He is alert and oriented to person, place, and time. No cranial nerve deficit.   Skin: Skin is warm.   Nursing note and vitals reviewed.        PERTINENT RESULTS:   CBC:  No results for input(s): WBC, RBC, HGB, HCT, PLT, MCV, MCH, MCHC in the last 2160 hours.  CMP:  Recent Labs   Lab Result Units 05/03/19  0715   Albumin g/dL 3.8   Total Protein g/dL 6.8   Alkaline Phosphatase U/L 58   ALT U/L 32   AST U/L 22   Total Bilirubin mg/dL 0.6 "     URINALYSIS:  No results for input(s): COLORU, CLARITYU, SPECGRAV, PHUR, PROTEINUA, GLUCOSEU, BILIRUBINCON, BLOODU, WBCU, RBCU, BACTERIA, MUCUS, NITRITE, LEUKOCYTESUR, UROBILINOGEN, HYALINECASTS in the last 2160 hours.   LIPIDS:  No results for input(s): TSH, HDL, CHOL, TRIG, LDLCALC, CHOLHDL, NONHDLCHOL, TOTALCHOLEST in the last 2160 hours.          ASSESSMENT:  Problem List Items Addressed This Visit     None      Visit Diagnoses     Acute non-recurrent maxillary sinusitis    -  Primary    Relevant Medications    levoFLOXacin (LEVAQUIN) 500 MG tablet    Acute suppurative otitis media of right ear without spontaneous rupture of tympanic membrane, recurrence not specified        Relevant Medications    levoFLOXacin (LEVAQUIN) 500 MG tablet          PLAN:   Orders Placed This Encounter    levoFLOXacin (LEVAQUIN) 500 MG tablet     No orders of the defined types were placed in this encounter.  May take antihistamine, nasal saline douches.  Tylenol for pain.  If develops further symptoms notify office,  .    Follow-up with me in prn.   Dr. Terell Pennington  Internal Medicine

## 2019-08-16 ENCOUNTER — PATIENT MESSAGE (OUTPATIENT)
Dept: INTERNAL MEDICINE | Facility: CLINIC | Age: 39
End: 2019-08-16

## 2019-08-16 ENCOUNTER — OFFICE VISIT (OUTPATIENT)
Dept: INTERNAL MEDICINE | Facility: CLINIC | Age: 39
End: 2019-08-16
Payer: COMMERCIAL

## 2019-08-16 VITALS
OXYGEN SATURATION: 98 % | HEART RATE: 65 BPM | DIASTOLIC BLOOD PRESSURE: 80 MMHG | HEIGHT: 70 IN | SYSTOLIC BLOOD PRESSURE: 130 MMHG | BODY MASS INDEX: 35.63 KG/M2 | WEIGHT: 248.88 LBS

## 2019-08-16 DIAGNOSIS — R25.2 LEG CRAMPING: ICD-10-CM

## 2019-08-16 DIAGNOSIS — R79.89 LFT ELEVATION: Primary | ICD-10-CM

## 2019-08-16 DIAGNOSIS — E66.9 CLASS 2 OBESITY WITH BODY MASS INDEX (BMI) OF 35.0 TO 35.9 IN ADULT, UNSPECIFIED OBESITY TYPE, UNSPECIFIED WHETHER SERIOUS COMORBIDITY PRESENT: ICD-10-CM

## 2019-08-16 DIAGNOSIS — Z00.00 ROUTINE GENERAL MEDICAL EXAMINATION AT A HEALTH CARE FACILITY: ICD-10-CM

## 2019-08-16 DIAGNOSIS — Z00.00 PREVENTATIVE HEALTH CARE: ICD-10-CM

## 2019-08-16 PROBLEM — M79.89 LEG SWELLING: Status: ACTIVE | Noted: 2019-08-16

## 2019-08-16 PROCEDURE — 99395 PREV VISIT EST AGE 18-39: CPT | Mod: S$GLB,,, | Performed by: INTERNAL MEDICINE

## 2019-08-16 PROCEDURE — 99999 PR PBB SHADOW E&M-EST. PATIENT-LVL III: ICD-10-PCS | Mod: PBBFAC,,, | Performed by: INTERNAL MEDICINE

## 2019-08-16 PROCEDURE — 99999 PR PBB SHADOW E&M-EST. PATIENT-LVL III: CPT | Mod: PBBFAC,,, | Performed by: INTERNAL MEDICINE

## 2019-08-16 PROCEDURE — 99395 PR PREVENTIVE VISIT,EST,18-39: ICD-10-PCS | Mod: S$GLB,,, | Performed by: INTERNAL MEDICINE

## 2019-08-16 NOTE — PROGRESS NOTES
Answers for HPI/ROS submitted by the patient on 8/15/2019   activity change: No  unexpected weight change: No  rhinorrhea: No  trouble swallowing: No  visual disturbance: No  chest tightness: No  polyuria: No  difficulty urinating: No  joint swelling: No  arthralgias: Yes  confusion: No  dysphoric mood: No  Pt. ID: Gallito Wayne is a 39 y.o. male      Chief complaint:   Chief Complaint   Patient presents with    Annual Exam       HPI: Pt. Here for well visit; I reviewed labs dated 5/3/19; LFT's have normalized and hepatitis profile is negative; he would like liver US to evaluate for fatty liver; he has lost weight; he reports occasional B/L leg cramps; he states he is asymptomatic after starting MVI; of note, pt. Was recently treated for otitis with levaquin and denies pain      Review of Systems   Constitutional: Negative for chills and fever.   HENT: Negative for ear pain and hearing loss.    Eyes: Negative for discharge.   Respiratory: Negative for wheezing.    Cardiovascular: Negative for chest pain and palpitations.   Gastrointestinal: Negative for blood in stool, constipation, diarrhea and vomiting.   Genitourinary: Negative for hematuria and urgency.   Musculoskeletal: Positive for myalgias. Negative for neck pain.        Intermittent leg cramps which have since improved after starting MVI     Neurological: Negative for weakness and headaches.   Endo/Heme/Allergies: Negative for polydipsia.         Objective:    Physical Exam   Constitutional: He is oriented to person, place, and time.   obese   Eyes: EOM are normal.   Neck: Normal range of motion.   Cardiovascular: Normal rate, regular rhythm and normal heart sounds.   Pulmonary/Chest: Effort normal and breath sounds normal. No respiratory distress. He has no wheezes. He has no rales.   Abdominal: Soft. There is no tenderness. There is no rebound and no guarding.   Musculoskeletal: Normal range of motion.   Neurological: He is alert and oriented to  person, place, and time.   Skin: No rash noted.   Vitals reviewed.        Health Maintenance   Topic Date Due    Lipid Panel  12/27/2023    TETANUS VACCINE  01/04/2026         Assessment:     1. LFT elevation Well controlled   2. Leg cramping Active   3. Class 2 obesity with body mass index (BMI) of 35.0 to 35.9 in adult, unspecified obesity type, unspecified whether serious comorbidity present Sub-optimally controlled   4. Routine general medical examination at a health care facility Active   5. Preventative health care Active         Plan: LFT elevation  Comments:  normalized; pt. would like to be evaluated for fatty liver   Orders:  -     Comprehensive metabolic panel; Future; Expected date: 08/17/2019  -     US Abdomen Complete; Future; Expected date: 08/16/2019    Leg cramping  Comments:  encouraged PO fluid intake and continue MVI    Class 2 obesity with body mass index (BMI) of 35.0 to 35.9 in adult, unspecified obesity type, unspecified whether serious comorbidity present  Comments:  encouraged diet and explained risks     Routine general medical examination at a health care facility    Preventative health care  -     CBC auto differential; Future; Expected date: 08/17/2019  -     Comprehensive metabolic panel; Future; Expected date: 08/17/2019  -     Lipid panel; Future; Expected date: 08/17/2019        Problem List Items Addressed This Visit        Endocrine    Class 2 obesity with body mass index (BMI) of 35.0 to 35.9 in adult    Overview     encouraged diet and explained risks            GI    LFT elevation - Primary    Overview     repeat LFT's with hepatitis profile         Relevant Orders    Comprehensive metabolic panel    US Abdomen Complete       Other    Preventative health care    Overview     get screening labs         Relevant Orders    CBC auto differential    Comprehensive metabolic panel    Lipid panel    Leg swelling        Answers for HPI/ROS submitted by the patient on 8/15/2019    activity change: No  unexpected weight change: No  rhinorrhea: No  trouble swallowing: No  visual disturbance: No  chest tightness: No  polyuria: No  difficulty urinating: No  joint swelling: No  arthralgias: Yes  confusion: No  dysphoric mood: No

## 2019-08-22 ENCOUNTER — TELEPHONE (OUTPATIENT)
Dept: FAMILY MEDICINE | Facility: CLINIC | Age: 39
End: 2019-08-22

## 2019-08-22 NOTE — TELEPHONE ENCOUNTER
Notify pt. That work physical form is complete; there is an area for pt. Signature, if he needs to also sign, have pt. Do so

## 2019-08-29 ENCOUNTER — PATIENT MESSAGE (OUTPATIENT)
Dept: INTERNAL MEDICINE | Facility: CLINIC | Age: 39
End: 2019-08-29

## 2019-09-11 ENCOUNTER — PATIENT MESSAGE (OUTPATIENT)
Dept: FAMILY MEDICINE | Facility: CLINIC | Age: 39
End: 2019-09-11

## 2019-09-27 ENCOUNTER — PATIENT MESSAGE (OUTPATIENT)
Dept: INTERNAL MEDICINE | Facility: CLINIC | Age: 39
End: 2019-09-27

## 2019-10-04 ENCOUNTER — PATIENT MESSAGE (OUTPATIENT)
Dept: INTERNAL MEDICINE | Facility: CLINIC | Age: 39
End: 2019-10-04

## 2019-10-10 ENCOUNTER — OFFICE VISIT (OUTPATIENT)
Dept: INTERNAL MEDICINE | Facility: CLINIC | Age: 39
End: 2019-10-10
Payer: COMMERCIAL

## 2019-10-10 VITALS
BODY MASS INDEX: 36.09 KG/M2 | WEIGHT: 252.13 LBS | DIASTOLIC BLOOD PRESSURE: 70 MMHG | SYSTOLIC BLOOD PRESSURE: 116 MMHG | HEIGHT: 70 IN | HEART RATE: 60 BPM | RESPIRATION RATE: 18 BRPM | OXYGEN SATURATION: 96 % | TEMPERATURE: 98 F

## 2019-10-10 DIAGNOSIS — R39.9 LOWER URINARY TRACT SYMPTOMS (LUTS): ICD-10-CM

## 2019-10-10 DIAGNOSIS — R82.90 ABNORMAL URINE FINDINGS: Primary | ICD-10-CM

## 2019-10-10 DIAGNOSIS — Z23 NEED FOR INFLUENZA VACCINATION: ICD-10-CM

## 2019-10-10 PROCEDURE — 99999 PR PBB SHADOW E&M-EST. PATIENT-LVL IV: CPT | Mod: PBBFAC,,, | Performed by: INTERNAL MEDICINE

## 2019-10-10 PROCEDURE — 90471 FLU VACCINE (QUAD) GREATER THAN OR EQUAL TO 3YO PRESERVATIVE FREE IM: ICD-10-PCS | Mod: S$GLB,,, | Performed by: INTERNAL MEDICINE

## 2019-10-10 PROCEDURE — 99213 OFFICE O/P EST LOW 20 MIN: CPT | Mod: 25,S$GLB,, | Performed by: INTERNAL MEDICINE

## 2019-10-10 PROCEDURE — 90686 FLU VACCINE (QUAD) GREATER THAN OR EQUAL TO 3YO PRESERVATIVE FREE IM: ICD-10-PCS | Mod: S$GLB,,, | Performed by: INTERNAL MEDICINE

## 2019-10-10 PROCEDURE — 90471 IMMUNIZATION ADMIN: CPT | Mod: S$GLB,,, | Performed by: INTERNAL MEDICINE

## 2019-10-10 PROCEDURE — 99999 PR PBB SHADOW E&M-EST. PATIENT-LVL IV: ICD-10-PCS | Mod: PBBFAC,,, | Performed by: INTERNAL MEDICINE

## 2019-10-10 PROCEDURE — 3008F BODY MASS INDEX DOCD: CPT | Mod: CPTII,S$GLB,, | Performed by: INTERNAL MEDICINE

## 2019-10-10 PROCEDURE — 3008F PR BODY MASS INDEX (BMI) DOCUMENTED: ICD-10-PCS | Mod: CPTII,S$GLB,, | Performed by: INTERNAL MEDICINE

## 2019-10-10 PROCEDURE — 99213 PR OFFICE/OUTPT VISIT, EST, LEVL III, 20-29 MIN: ICD-10-PCS | Mod: 25,S$GLB,, | Performed by: INTERNAL MEDICINE

## 2019-10-10 PROCEDURE — 90686 IIV4 VACC NO PRSV 0.5 ML IM: CPT | Mod: S$GLB,,, | Performed by: INTERNAL MEDICINE

## 2019-10-10 NOTE — PROGRESS NOTES
INTERNAL MEDICINE    Patient Active Problem List   Diagnosis    Chronic daily headache    Migraine without aura, without mention of intractable migraine without mention of status migrainosus    Lower urinary tract symptoms (LUTS)    Hypercholesteremia    Class 2 obesity with body mass index (BMI) of 35.0 to 35.9 in adult    [facility]    Chest pain    Gastroesophageal reflux disease    Need for tetanus booster    Needs flu shot    Overweight (BMI 25.0-29.9)    Abnormal CXR    LFT elevation    Neck strain    Psoriasis    Acute right-sided low back pain without sciatica    Leg swelling       CC:   Chief Complaint   Patient presents with    Urine     abdnormal urine results from physical    Flu Vaccine       SUBJECTIVE:  Gallito Wayne   is a 39 y.o. male  HPI   39y/oWM, who travels long distances once a month, went for his [employer] physical and had an abnormal urinalysis.Told to see a PCP. His just retired.    I have reviewed his PMH,SH,FH.    The pt has had LUTS in the past, treated with antibiotics, fluids.  He had been on a long transatlantic flight recently, which he does every 30 days. Has not needed to take NSAI recently. No trauma. No dietary indiscretions or nephrotoxic drugs.        ROS: Review of Systems   Constitutional: Negative for activity change, appetite change, chills, diaphoresis, fatigue, fever and unexpected weight change.   HENT: Negative for hearing loss, rhinorrhea and trouble swallowing.    Eyes: Negative for discharge and visual disturbance.   Respiratory: Negative for chest tightness, shortness of breath and wheezing.    Cardiovascular: Negative for chest pain, palpitations and leg swelling.   Gastrointestinal: Negative for abdominal pain, blood in stool, constipation, diarrhea and vomiting.   Endocrine: Negative for polydipsia and polyuria.   Genitourinary: Positive for hematuria. Negative for decreased urine volume, difficulty urinating, discharge,  "dysuria, enuresis, flank pain, frequency, genital sores, penile pain, penile swelling, scrotal swelling, testicular pain and urgency.   Musculoskeletal: Negative for arthralgias, joint swelling and neck pain.   Skin: Negative for rash.   Neurological: Negative for weakness and headaches.   Psychiatric/Behavioral: Negative for confusion and dysphoric mood.       Past Medical History:   Diagnosis Date    Chest pain 1/4/2016    Headache        Past Surgical History:   Procedure Laterality Date    BREAST SURGERY      Hormonal growth       Family History   Problem Relation Age of Onset    Diabetes Mother     Diabetes Father     Prostate cancer Neg Hx     Kidney disease Neg Hx        Social History     Tobacco Use    Smoking status: Never Smoker    Smokeless tobacco: Never Used   Substance Use Topics    Alcohol use: Yes     Frequency: 2-3 times a week     Drinks per session: 1 or 2     Binge frequency: Never     Comment: socially    Drug use: No       Social History     Social History Narrative    Not on file       ALLERGIES: Review of patient's allergies indicates:  No Known Allergies    MEDS:   Current Outpatient Medications:     cetirizine (ZYRTEC) 10 MG tablet, Take 10 mg by mouth once daily., Disp: , Rfl:     OBJECTIVE:   Vitals:    10/10/19 0813   BP: 116/70   BP Location: Left arm   Patient Position: Sitting   BP Method: X-Large (Manual)   Pulse: 60   Resp: 18   Temp: 97.8 °F (36.6 °C)   TempSrc: Oral   SpO2: 96%   Weight: 114.4 kg (252 lb 1.6 oz)   Height: 5' 10" (1.778 m)     Body mass index is 36.17 kg/m².    Physical Exam   Constitutional: He is oriented to person, place, and time. Vital signs are normal. He appears well-developed and well-nourished. He is sleeping, active and cooperative.  Non-toxic appearance. He does not have a sickly appearance. He does not appear ill.   HENT:   Head: Normocephalic and atraumatic. Not microcephalic. Head is without raccoon's eyes, without Hayden's sign, without " abrasion, without contusion, without laceration, without right periorbital erythema and without left periorbital erythema. Hair is normal.   Right Ear: Hearing, tympanic membrane, external ear and ear canal normal.   Left Ear: Hearing, tympanic membrane, external ear and ear canal normal.   Nose: Nose normal.   Mouth/Throat: Uvula is midline and oropharynx is clear and moist.   Eyes: Pupils are equal, round, and reactive to light. Conjunctivae, EOM and lids are normal.   Neck: Trachea normal, normal range of motion, full passive range of motion without pain and phonation normal. Neck supple. Normal carotid pulses, no hepatojugular reflux and no JVD present. Carotid bruit is not present. No Brudzinski's sign and no Kernig's sign noted. No thyroid mass present.   Cardiovascular: Normal rate, regular rhythm, S1 normal, S2 normal and normal heart sounds.  No extrasystoles are present. PMI is not displaced.   Pulmonary/Chest: Effort normal and breath sounds normal.   Abdominal: Soft. Normal appearance and bowel sounds are normal. He exhibits no distension and no mass. There is no hepatosplenomegaly. There is no tenderness. There is no rebound and no guarding.   Genitourinary: Testes normal. Cremasteric reflex is present.   Musculoskeletal: Normal range of motion.   Lymphadenopathy:        Head (right side): No submental, no submandibular, no tonsillar, no preauricular, no posterior auricular and no occipital adenopathy present.        Head (left side): No submental, no submandibular, no tonsillar, no preauricular, no posterior auricular and no occipital adenopathy present.     He has no cervical adenopathy.     He has no axillary adenopathy.        Right: No inguinal, no supraclavicular and no epitrochlear adenopathy present.        Left: No inguinal, no supraclavicular and no epitrochlear adenopathy present.   Neurological: He is alert and oriented to person, place, and time. He has normal strength and normal reflexes.  No sensory deficit. He displays a negative Romberg sign.   Reflex Scores:       Tricep reflexes are 2+ on the right side and 2+ on the left side.       Bicep reflexes are 2+ on the right side and 2+ on the left side.       Brachioradialis reflexes are 2+ on the right side and 2+ on the left side.       Patellar reflexes are 2+ on the right side and 2+ on the left side.       Achilles reflexes are 2+ on the right side and 2+ on the left side.  Skin: Skin is warm and intact. No abrasion, no bruising, no burn, no ecchymosis, no laceration, no lesion, no petechiae and no purpura noted. Rash is not macular, not papular, not maculopapular, not nodular, not pustular, not vesicular and not urticarial. No cyanosis. Nails show no clubbing.   Psychiatric: He has a normal mood and affect. His speech is normal and behavior is normal. Judgment and thought content normal. He is not actively hallucinating. Cognition and memory are normal.   Nursing note and vitals reviewed.        PERTINENT RESULTS:   CBC:  Recent Labs   Lab Result Units 08/21/19  0828   WBC K/uL 6.33   RBC M/uL 5.11   Hemoglobin g/dL 15.2   Hematocrit % 44.2   Platelets K/uL 321   Mean Corpuscular Volume fL 87   Mean Corpuscular Hemoglobin pg 29.7   Mean Corpuscular Hemoglobin Conc g/dL 34.4     CMP:  Recent Labs   Lab Result Units 08/21/19  0828   Glucose mg/dL 102   Calcium mg/dL 9.2   Albumin g/dL 4.4   Total Protein g/dL 7.8   Sodium mmol/L 140   Potassium mmol/L 4.3   CO2 mmol/L 28   Chloride mmol/L 105   BUN, Bld mg/dL 16   Alkaline Phosphatase U/L 60   ALT U/L 21   AST U/L 27   Total Bilirubin mg/dL 0.5     URINALYSIS:  Recent Labs   Lab Result Units 10/10/19  0856   Color, UA  Yellow   Specific Gravity, UA  1.020   pH, UA  7.0   Protein, UA  Negative   Nitrite, UA  Negative   Leukocytes, UA  Negative   Urobilinogen, UA EU/dL Negative      LIPIDS:  Recent Labs   Lab Result Units 08/21/19  0828   HDL mg/dL 45   Cholesterol mg/dL 159   Triglycerides mg/dL 68    LDL Cholesterol mg/dL 100.4   Hdl/Cholesterol Ratio % 28.3   Non-HDL Cholesterol mg/dL 114   Total Cholesterol/HDL Ratio  3.5             ASSESSMENT:  Problem List Items Addressed This Visit        Renal/    Lower urinary tract symptoms (LUTS)    Overview     prostate exam WNL; asked pt. to f/u with his urologist           Other Visit Diagnoses     Abnormal urine findings    -  Primary    Relevant Orders    Urine culture (Completed)    URINALYSIS (Completed)    Need for influenza vaccination        Relevant Orders    Influenza - Quadrivalent (PF) (Completed)          PLAN:   Orders Placed This Encounter    Urine culture    Influenza - Quadrivalent (PF)    URINALYSIS     Orders Placed This Encounter   Procedures    Urine culture     Standing Status:   Future     Number of Occurrences:   1     Standing Expiration Date:   12/9/2019    Influenza - Quadrivalent (PF)    URINALYSIS     Standing Status:   Future     Number of Occurrences:   1     Standing Expiration Date:   12/8/2020       Follow-up with me in 1. Week if necessary.Dr. Terell Pennington  Internal Medicine

## 2019-10-13 PROBLEM — R35.1 NOCTURIA: Status: RESOLVED | Noted: 2017-08-18 | Resolved: 2019-10-13

## 2019-10-14 ENCOUNTER — TELEPHONE (OUTPATIENT)
Dept: FAMILY MEDICINE | Facility: CLINIC | Age: 39
End: 2019-10-14

## 2019-10-14 NOTE — TELEPHONE ENCOUNTER
----- Message from [facility] SONNY Pennington MD sent at 10/14/2019 12:24 PM CDT -----  Plz call pt and let him know, urine culture negative.  ALYSHA  TSA

## 2020-01-27 ENCOUNTER — OFFICE VISIT (OUTPATIENT)
Dept: FAMILY MEDICINE | Facility: CLINIC | Age: 40
End: 2020-01-27
Payer: COMMERCIAL

## 2020-01-27 VITALS
DIASTOLIC BLOOD PRESSURE: 64 MMHG | SYSTOLIC BLOOD PRESSURE: 116 MMHG | OXYGEN SATURATION: 97 % | HEIGHT: 70 IN | RESPIRATION RATE: 17 BRPM | HEART RATE: 64 BPM | TEMPERATURE: 98 F | WEIGHT: 258.81 LBS | BODY MASS INDEX: 37.05 KG/M2

## 2020-01-27 DIAGNOSIS — S16.1XXA STRAIN OF NECK MUSCLE, INITIAL ENCOUNTER: Primary | ICD-10-CM

## 2020-01-27 DIAGNOSIS — E66.01 CLASS 2 SEVERE OBESITY WITH SERIOUS COMORBIDITY AND BODY MASS INDEX (BMI) OF 38.0 TO 38.9 IN ADULT, UNSPECIFIED OBESITY TYPE: ICD-10-CM

## 2020-01-27 DIAGNOSIS — E78.00 HYPERCHOLESTEREMIA: ICD-10-CM

## 2020-01-27 DIAGNOSIS — Z00.00 ANNUAL PHYSICAL EXAM: ICD-10-CM

## 2020-01-27 PROCEDURE — 99999 PR PBB SHADOW E&M-EST. PATIENT-LVL III: CPT | Mod: PBBFAC,,, | Performed by: INTERNAL MEDICINE

## 2020-01-27 PROCEDURE — 3008F BODY MASS INDEX DOCD: CPT | Mod: CPTII,S$GLB,, | Performed by: INTERNAL MEDICINE

## 2020-01-27 PROCEDURE — 3008F PR BODY MASS INDEX (BMI) DOCUMENTED: ICD-10-PCS | Mod: CPTII,S$GLB,, | Performed by: INTERNAL MEDICINE

## 2020-01-27 PROCEDURE — 99999 PR PBB SHADOW E&M-EST. PATIENT-LVL III: ICD-10-PCS | Mod: PBBFAC,,, | Performed by: INTERNAL MEDICINE

## 2020-01-27 PROCEDURE — 99214 PR OFFICE/OUTPT VISIT, EST, LEVL IV, 30-39 MIN: ICD-10-PCS | Mod: S$GLB,,, | Performed by: INTERNAL MEDICINE

## 2020-01-27 PROCEDURE — 99214 OFFICE O/P EST MOD 30 MIN: CPT | Mod: S$GLB,,, | Performed by: INTERNAL MEDICINE

## 2020-01-27 RX ORDER — METHYLPREDNISOLONE 4 MG/1
TABLET ORAL
Qty: 21 TABLET | Refills: 0 | Status: SHIPPED | OUTPATIENT
Start: 2020-01-27 | End: 2020-02-17

## 2020-01-27 NOTE — PROGRESS NOTES
[facility] Note    Chief Complaint      Chief Complaint   Patient presents with    Neck Pain     Pt here for persistent neck pain/ started 2 months ago/ back of neck      History of Present Illness      Gallito Wayne is a 39 y.o. male who presents today for neck pain.  Patient comes to appointment alone.    Problem List Items Addressed This Visit     Hypercholesteremia    Current Assessment & Plan     Total chol elevated on 8/2019 labs, not on medications. Stable.         Relevant Orders    Lipid panel    Class 2 severe obesity with serious comorbidity and body mass index (BMI) of 38.0 to 38.9 in adult    Overview     encouraged diet and explained risks         Neck strain - Primary    Current Assessment & Plan     Started 2 months ago, went to chiropractor. Was on an 18 hour flight prior to pain starting.  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  Takes NSAIDS which helps some.  Moves from side. No longer has headaches.  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  Feels like a pulling soreness, runs up to occiput. Pain up to 6/10, 1-2/10 the rest of the time.  Has good ROM.  [place] like he was getting some relief from chiropractor.  Got a new pillow which helped some. No history of injuries.  No weakness/numbness/tingling of extremities.         Relevant Medications    methylPREDNISolone (MEDROL DOSEPACK) 4 mg tablet      Other Visit Diagnoses     Annual physical exam        Relevant Orders    CBC auto differential    Comprehensive metabolic panel          Health Maintenance   Topic Date Due    Lipid Panel  08/21/2024    TETANUS VACCINE  01/04/2026       Past Medical History:   Diagnosis Date    Chest pain 1/4/2016    Headache        Past Surgical History:   Procedure Laterality Date    BREAST SURGERY      Hormonal growth       family history includes Diabetes in his father and mother.     Social History     Tobacco Use     "Smoking status: Never Smoker    Smokeless tobacco: Never Used   Substance Use Topics    Alcohol use: Yes     Frequency: 2-3 times a week     Drinks per session: 1 or 2     Binge frequency: Never     Comment: socially    Drug use: No       Review of Systems   Constitutional: Negative for chills and fever.   HENT: Negative for congestion and sore throat.    Eyes: Negative for blurred vision and discharge.   Respiratory: Negative for cough and shortness of breath.    Cardiovascular: Negative for chest pain and palpitations.   Gastrointestinal: Negative for constipation, diarrhea, nausea and vomiting.   Genitourinary: Negative for dysuria and hematuria.   Musculoskeletal: Negative for falls and myalgias.   Skin: Negative for itching and rash.   Neurological: Negative for dizziness and headaches.        Outpatient Encounter Medications as of 1/27/2020   Medication Sig Dispense Refill    cetirizine (ZYRTEC) 10 MG tablet Take 10 mg by mouth once daily.      methylPREDNISolone (MEDROL DOSEPACK) 4 mg tablet use as directed 1 Package 0     No facility-administered encounter medications on file as of 1/27/2020.        Review of patient's allergies indicates:  No Known Allergies    Physical Exam      Vital Signs  Temp: 97.7 °F (36.5 °C)  Pulse: 64  Resp: 17  SpO2: 97 %  BP: 116/64  BP Location: Left arm  Patient Position: Sitting  Pain Score:   4  Pain Loc: Neck  Height and Weight  Height: 5' 10" (177.8 cm)  Weight: 117.4 kg (258 lb 13.1 oz)  BSA (Calculated - sq m): 2.41 sq meters  BMI (Calculated): 37.1  Weight in (lb) to have BMI = 25: 173.9]    Physical Exam   Constitutional: He is oriented to person, place, and time. He appears well-developed and well-nourished.   HENT:   Head: Normocephalic and atraumatic.   Right Ear: External ear normal.   Left Ear: External ear normal.   Eyes: Right eye exhibits no discharge. Left eye exhibits no discharge.   Neck: Normal range of motion. No thyromegaly present.   Cardiovascular: " Normal rate, regular rhythm and intact distal pulses.   No murmur heard.  Pulmonary/Chest: Effort normal and breath sounds normal. No respiratory distress.   Abdominal: Soft. Bowel sounds are normal. He exhibits no distension. There is no tenderness.   Musculoskeletal: Normal range of motion. He exhibits no deformity.   Neurological: He is alert and oriented to person, place, and time.   Skin: Skin is warm and dry. No rash noted.   Psychiatric: He has a normal mood and affect. His behavior is normal.        Laboratory:  CBC:  No results for input(s): WBC, RBC, HGB, HCT, PLT, MCV, MCH, MCHC in the last 2160 hours.  CMP:  No results for input(s): GLU, CALCIUM, ALBUMIN, PROT, NA, K, CO2, CL, BUN, ALKPHOS, ALT, AST, BILITOT in the last 2160 hours.    Invalid input(s): CREATININ  URINALYSIS:  No results for input(s): COLORU, CLARITYU, SPECGRAV, PHUR, PROTEINUA, GLUCOSEU, BILIRUBINCON, BLOODU, WBCU, RBCU, BACTERIA, MUCUS, NITRITE, LEUKOCYTESUR, UROBILINOGEN, HYALINECASTS in the last 2160 hours.   LIPIDS:  No results for input(s): TSH, HDL, CHOL, TRIG, LDLCALC, CHOLHDL, NONHDLCHOL, TOTALCHOLEST in the last 2160 hours.  TSH:  No results for input(s): TSH in the last 2160 hours.  A1C:  No results for input(s): HGBA1C in the last 2160 hours.    Radiology:  No new imaging on file    Assessment/Plan     Gallito Wayne is a 39 y.o.male with:    1. Strain of neck muscle, initial encounter  - methylPREDNISolone (MEDROL DOSEPACK) 4 mg tablet; use as directed  Dispense: 1 Package; Refill: 0    2. Annual physical exam  - CBC auto differential; Future  - Comprehensive metabolic panel; Future    3. Class 2 severe obesity with serious comorbidity and body mass index (BMI) of 38.0 to 38.9 in adult, unspecified obesity type    4. Hypercholesteremia  - Lipid panel; Future    -list given on neck exercises, will try medrol dose pack  -get xrays from chiropractor, [facility]  -Continue current medications and maintain follow  up with specialists.  Return to clinic in 8/2020 for annual exam with labs       Sharmin Little MD  [facility]

## 2020-01-27 NOTE — ASSESSMENT & PLAN NOTE
Started 2 months ago, went to chiropractor. Was on an 18 hour flight prior to pain starting.  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  Takes NSAIDS which helps some.  Moves from side. No longer has headaches.  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  Feels like a pulling soreness, runs up to occiput. Pain up to 6/10, 1-2/10 the rest of the time.  Has good ROM.  [place] like he was getting some relief from chiropractor.  Got a new pillow which helped some. No history of injuries.  No weakness/numbness/tingling of extremities.

## 2020-05-12 ENCOUNTER — TELEPHONE (OUTPATIENT)
Dept: FAMILY MEDICINE | Facility: CLINIC | Age: 40
End: 2020-05-12

## 2020-05-12 NOTE — TELEPHONE ENCOUNTER
----- Message from Saloni Fitzgerald sent at 5/12/2020  2:08 PM CDT -----  Contact: patient  Type:  Patient Returning Call    Who Called: Patient  Who Left Message for Patient: Naomi  Does the patient know what this is regarding?: Upcoming appt  Would the patient rather a call back or a response via [facility]? Call back  Best Call Back Number: [phone]  Additional Information: n/a

## 2020-05-13 ENCOUNTER — OFFICE VISIT (OUTPATIENT)
Dept: FAMILY MEDICINE | Facility: CLINIC | Age: 40
End: 2020-05-13
Payer: COMMERCIAL

## 2020-05-13 VITALS
BODY MASS INDEX: 37.71 KG/M2 | DIASTOLIC BLOOD PRESSURE: 78 MMHG | SYSTOLIC BLOOD PRESSURE: 126 MMHG | WEIGHT: 262.81 LBS | TEMPERATURE: 98 F | HEART RATE: 62 BPM | OXYGEN SATURATION: 97 %

## 2020-05-13 DIAGNOSIS — M54.2 NECK PAIN: Primary | ICD-10-CM

## 2020-05-13 PROCEDURE — 99214 OFFICE O/P EST MOD 30 MIN: CPT | Mod: S$GLB,,, | Performed by: INTERNAL MEDICINE

## 2020-05-13 PROCEDURE — 3008F PR BODY MASS INDEX (BMI) DOCUMENTED: ICD-10-PCS | Mod: CPTII,S$GLB,, | Performed by: INTERNAL MEDICINE

## 2020-05-13 PROCEDURE — 99999 PR PBB SHADOW E&M-EST. PATIENT-LVL III: CPT | Mod: PBBFAC,,, | Performed by: INTERNAL MEDICINE

## 2020-05-13 PROCEDURE — 3008F BODY MASS INDEX DOCD: CPT | Mod: CPTII,S$GLB,, | Performed by: INTERNAL MEDICINE

## 2020-05-13 PROCEDURE — 99999 PR PBB SHADOW E&M-EST. PATIENT-LVL III: ICD-10-PCS | Mod: PBBFAC,,, | Performed by: INTERNAL MEDICINE

## 2020-05-13 PROCEDURE — 99214 PR OFFICE/OUTPT VISIT, EST, LEVL IV, 30-39 MIN: ICD-10-PCS | Mod: S$GLB,,, | Performed by: INTERNAL MEDICINE

## 2020-05-13 NOTE — ASSESSMENT & PLAN NOTE
Started in 11/2019.  went to chiropractor. Was on an 18 hour flight prior to pain starting.  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  Takes NSAIDS which helps some.  Moves from side.  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   Pain up to 6/10, 1-2/10 the rest of the time.  Has good ROM.   No history of injuries.  No weakness/numbness/tingling of extremities.

## 2020-05-13 NOTE — PROGRESS NOTES
[facility] Note    Chief Complaint      Chief Complaint   Patient presents with    Neck Pain     History of Present Illness      Gallito Wayne is a 40 y.o. male who presents today for neck pain.  Patient comes to appointment alone.    Problem List Items Addressed This Visit     Neck pain - Primary    Current Assessment & Plan     Started in 11/2019.  went to chiropractor. Was on an 18 hour flight prior to pain starting.  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  Takes NSAIDS which helps some.  Moves from side.  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   Pain up to 6/10, 1-2/10 the rest of the time.  Has good ROM.   No history of injuries.  No weakness/numbness/tingling of extremities.         Relevant Orders    MRI Cervical Spine Without Contrast          Health Maintenance   Topic Date Due    Lipid Panel  08/21/2024    TETANUS VACCINE  01/04/2026       Past Medical History:   Diagnosis Date    Chest pain 1/4/2016    Headache        Past Surgical History:   Procedure Laterality Date    BREAST SURGERY      Hormonal growth       family history includes Cancer in his paternal grandfather; Diabetes in his father and mother; Early death in his father; Heart disease in his mother; Hyperlipidemia in his mother.     Social History     Tobacco Use    Smoking status: Never Smoker    Smokeless tobacco: Never Used   Substance Use Topics    Alcohol use: Yes     Alcohol/week: 4.0 standard drinks     Types: 4 Cans of beer per week     Frequency: 2-3 times a week     Drinks per session: 1 or 2     Binge frequency: Never     Comment: socially    Drug use: No       Review of Systems   Constitutional: Negative for chills and fever.   HENT: Negative for congestion, hearing loss and sore throat.    Eyes: Negative for blurred vision and discharge.   Respiratory: Negative for cough, shortness of breath and  wheezing.    Cardiovascular: Negative for chest pain and palpitations.   Gastrointestinal: Negative for blood in stool, constipation, diarrhea, nausea and vomiting.   Genitourinary: Negative for dysuria, hematuria and urgency.   Musculoskeletal: Positive for neck pain. Negative for falls and myalgias.   Skin: Negative for itching and rash.   Neurological: Negative for dizziness, weakness and headaches.   Endo/Heme/Allergies: Negative for polydipsia.        Outpatient Encounter Medications as of 5/13/2020   Medication Sig Dispense Refill    cetirizine (ZYRTEC) 10 MG tablet Take 10 mg by mouth once daily.       No facility-administered encounter medications on file as of 5/13/2020.        Review of patient's allergies indicates:  No Known Allergies    Physical Exam      Vital Signs  Temp: 97.8 °F (36.6 °C)  Temp src: Oral  Pulse: 62  SpO2: 97 %  BP: 126/78  BP Location: Left arm  Patient Position: Sitting  Pain Score:   5  Pain Loc: Neck  Height and Weight  Weight: 119.2 kg (262 lb 12.6 oz)]    Physical Exam   Constitutional: He is oriented to person, place, and time. He appears well-developed and well-nourished.   HENT:   Head: Normocephalic and atraumatic.   Right Ear: External ear normal.   Left Ear: External ear normal.   Eyes: Right eye exhibits no discharge. Left eye exhibits no discharge.   Neck: Normal range of motion. No thyromegaly present.   Cardiovascular: Normal rate, regular rhythm and intact distal pulses.   No murmur heard.  Pulmonary/Chest: Effort normal and breath sounds normal. No respiratory distress.   Abdominal: Soft. Bowel sounds are normal. He exhibits no distension. There is no tenderness.   Musculoskeletal: Normal range of motion. He exhibits no deformity.   Neurological: He is alert and oriented to person, place, and time.   Skin: Skin is warm and dry. No rash noted.   Psychiatric: He has a normal mood and affect. His behavior is normal.        Laboratory:  CBC:  No results for input(s):  WBC, RBC, HGB, HCT, PLT, MCV, MCH, MCHC in the last 2160 hours.  CMP:  No results for input(s): GLU, CALCIUM, ALBUMIN, PROT, NA, K, CO2, CL, BUN, ALKPHOS, ALT, AST, BILITOT in the last 2160 hours.    Invalid input(s): CREATININ  URINALYSIS:  No results for input(s): COLORU, CLARITYU, SPECGRAV, PHUR, PROTEINUA, GLUCOSEU, BILIRUBINCON, BLOODU, WBCU, RBCU, BACTERIA, MUCUS, NITRITE, LEUKOCYTESUR, UROBILINOGEN, HYALINECASTS in the last 2160 hours.   LIPIDS:  No results for input(s): TSH, HDL, CHOL, TRIG, LDLCALC, CHOLHDL, NONHDLCHOL, TOTALCHOLEST in the last 2160 hours.  TSH:  No results for input(s): TSH in the last 2160 hours.  A1C:  No results for input(s): HGBA1C in the last 2160 hours.    Radiology:  No new imaging on file    Assessment/Plan     Gallito Wayne is a 40 y.o.male with:    1. Neck pain  - MRI Cervical Spine Without Contrast; Future    -Pain since 1/2020, no improvement with conservative therapy, will get MRI C spine  -Continue current medications and maintain follow up with specialists.  Return to clinic in 8/2020 for annual exam with labs       Sharmin Little MD  [facility]        Answers for HPI/ROS submitted by the patient on 5/10/2020   activity change: No  unexpected weight change: No  rhinorrhea: No  trouble swallowing: No  visual disturbance: No  chest tightness: No  polyuria: Yes  difficulty urinating: No  joint swelling: No  arthralgias: No  confusion: No  dysphoric mood: No

## 2020-05-14 ENCOUNTER — PATIENT MESSAGE (OUTPATIENT)
Dept: FAMILY MEDICINE | Facility: CLINIC | Age: 40
End: 2020-05-14

## 2021-03-28 ENCOUNTER — IMMUNIZATION (OUTPATIENT)
Dept: INTERNAL MEDICINE | Facility: CLINIC | Age: 41
End: 2021-03-28
Payer: COMMERCIAL

## 2021-03-28 DIAGNOSIS — Z23 NEED FOR VACCINATION: Primary | ICD-10-CM

## 2021-03-28 PROCEDURE — 0011A COVID-19, MRNA, LNP-S, PF, 100 MCG/0.5 ML DOSE VACCINE: CPT | Mod: CV19,S$GLB,, | Performed by: FAMILY MEDICINE

## 2021-03-28 PROCEDURE — 91301 COVID-19, MRNA, LNP-S, PF, 100 MCG/0.5 ML DOSE VACCINE: CPT | Mod: S$GLB,,, | Performed by: FAMILY MEDICINE

## 2021-03-28 PROCEDURE — 0011A COVID-19, MRNA, LNP-S, PF, 100 MCG/0.5 ML DOSE VACCINE: ICD-10-PCS | Mod: CV19,S$GLB,, | Performed by: FAMILY MEDICINE

## 2021-03-28 PROCEDURE — 91301 COVID-19, MRNA, LNP-S, PF, 100 MCG/0.5 ML DOSE VACCINE: ICD-10-PCS | Mod: S$GLB,,, | Performed by: FAMILY MEDICINE

## 2021-04-29 ENCOUNTER — IMMUNIZATION (OUTPATIENT)
Dept: INTERNAL MEDICINE | Facility: CLINIC | Age: 41
End: 2021-04-29
Payer: COMMERCIAL

## 2021-04-29 DIAGNOSIS — Z23 NEED FOR VACCINATION: Primary | ICD-10-CM

## 2021-04-29 PROCEDURE — 0012A COVID-19, MRNA, LNP-S, PF, 100 MCG/0.5 ML DOSE VACCINE: CPT | Mod: CV19,S$GLB,, | Performed by: INTERNAL MEDICINE

## 2021-04-29 PROCEDURE — 0012A COVID-19, MRNA, LNP-S, PF, 100 MCG/0.5 ML DOSE VACCINE: ICD-10-PCS | Mod: CV19,S$GLB,, | Performed by: INTERNAL MEDICINE

## 2021-04-29 PROCEDURE — 91301 COVID-19, MRNA, LNP-S, PF, 100 MCG/0.5 ML DOSE VACCINE: CPT | Mod: S$GLB,,, | Performed by: INTERNAL MEDICINE

## 2021-04-29 PROCEDURE — 91301 COVID-19, MRNA, LNP-S, PF, 100 MCG/0.5 ML DOSE VACCINE: ICD-10-PCS | Mod: S$GLB,,, | Performed by: INTERNAL MEDICINE

## 2021-05-11 ENCOUNTER — OFFICE VISIT (OUTPATIENT)
Dept: DERMATOLOGY | Facility: CLINIC | Age: 41
End: 2021-05-11
Payer: COMMERCIAL

## 2021-05-11 ENCOUNTER — PATIENT MESSAGE (OUTPATIENT)
Dept: DERMATOLOGY | Facility: CLINIC | Age: 41
End: 2021-05-11

## 2021-05-11 DIAGNOSIS — L98.9 DISEASE OF SKIN AND SUBCUTANEOUS TISSUE: Primary | ICD-10-CM

## 2021-05-11 PROCEDURE — 99204 PR OFFICE/OUTPT VISIT, NEW, LEVL IV, 45-59 MIN: ICD-10-PCS | Mod: 95,,, | Performed by: DERMATOLOGY

## 2021-05-11 PROCEDURE — 99204 OFFICE O/P NEW MOD 45 MIN: CPT | Mod: 95,,, | Performed by: DERMATOLOGY

## 2021-05-11 RX ORDER — DESONIDE 0.5 MG/G
CREAM TOPICAL 2 TIMES DAILY
Qty: 15 G | Refills: 0 | Status: SHIPPED | OUTPATIENT
Start: 2021-05-11 | End: 2021-07-26

## 2021-05-11 RX ORDER — FLUOCINONIDE 0.5 MG/G
CREAM TOPICAL 2 TIMES DAILY
Qty: 60 G | Refills: 0 | Status: SHIPPED | OUTPATIENT
Start: 2021-05-11 | End: 2022-01-03 | Stop reason: SDUPTHER

## 2021-07-07 ENCOUNTER — PATIENT MESSAGE (OUTPATIENT)
Dept: ADMINISTRATIVE | Facility: HOSPITAL | Age: 41
End: 2021-07-07

## 2021-07-26 ENCOUNTER — OFFICE VISIT (OUTPATIENT)
Dept: FAMILY MEDICINE | Facility: CLINIC | Age: 41
End: 2021-07-26
Payer: COMMERCIAL

## 2021-07-26 VITALS
HEART RATE: 84 BPM | WEIGHT: 258.38 LBS | OXYGEN SATURATION: 97 % | SYSTOLIC BLOOD PRESSURE: 112 MMHG | DIASTOLIC BLOOD PRESSURE: 74 MMHG | BODY MASS INDEX: 37.07 KG/M2 | TEMPERATURE: 98 F

## 2021-07-26 DIAGNOSIS — E66.01 CLASS 2 SEVERE OBESITY WITH SERIOUS COMORBIDITY AND BODY MASS INDEX (BMI) OF 37.0 TO 37.9 IN ADULT, UNSPECIFIED OBESITY TYPE: ICD-10-CM

## 2021-07-26 DIAGNOSIS — Z00.00 ANNUAL PHYSICAL EXAM: Primary | ICD-10-CM

## 2021-07-26 DIAGNOSIS — E78.00 HYPERCHOLESTEREMIA: ICD-10-CM

## 2021-07-26 DIAGNOSIS — Z11.4 SCREENING FOR HIV (HUMAN IMMUNODEFICIENCY VIRUS): ICD-10-CM

## 2021-07-26 DIAGNOSIS — L40.9 PSORIASIS: ICD-10-CM

## 2021-07-26 PROBLEM — M54.2 NECK PAIN: Status: RESOLVED | Noted: 2017-08-18 | Resolved: 2021-07-26

## 2021-07-26 PROCEDURE — 1159F PR MEDICATION LIST DOCUMENTED IN MEDICAL RECORD: ICD-10-PCS | Mod: CPTII,S$GLB,, | Performed by: INTERNAL MEDICINE

## 2021-07-26 PROCEDURE — 1126F AMNT PAIN NOTED NONE PRSNT: CPT | Mod: CPTII,S$GLB,, | Performed by: INTERNAL MEDICINE

## 2021-07-26 PROCEDURE — 99396 PREV VISIT EST AGE 40-64: CPT | Mod: S$GLB,,, | Performed by: INTERNAL MEDICINE

## 2021-07-26 PROCEDURE — 99999 PR PBB SHADOW E&M-EST. PATIENT-LVL III: ICD-10-PCS | Mod: PBBFAC,,, | Performed by: INTERNAL MEDICINE

## 2021-07-26 PROCEDURE — 99999 PR PBB SHADOW E&M-EST. PATIENT-LVL III: CPT | Mod: PBBFAC,,, | Performed by: INTERNAL MEDICINE

## 2021-07-26 PROCEDURE — 99396 PR PREVENTIVE VISIT,EST,40-64: ICD-10-PCS | Mod: S$GLB,,, | Performed by: INTERNAL MEDICINE

## 2021-07-26 PROCEDURE — 1126F PR PAIN SEVERITY QUANTIFIED, NO PAIN PRESENT: ICD-10-PCS | Mod: CPTII,S$GLB,, | Performed by: INTERNAL MEDICINE

## 2021-07-26 PROCEDURE — 1159F MED LIST DOCD IN RCRD: CPT | Mod: CPTII,S$GLB,, | Performed by: INTERNAL MEDICINE

## 2021-07-26 PROCEDURE — 3008F BODY MASS INDEX DOCD: CPT | Mod: CPTII,S$GLB,, | Performed by: INTERNAL MEDICINE

## 2021-07-26 PROCEDURE — 3008F PR BODY MASS INDEX (BMI) DOCUMENTED: ICD-10-PCS | Mod: CPTII,S$GLB,, | Performed by: INTERNAL MEDICINE

## 2021-07-26 RX ORDER — MINERAL OIL
180 ENEMA (ML) RECTAL DAILY
COMMUNITY

## 2021-11-18 ENCOUNTER — OFFICE VISIT (OUTPATIENT)
Dept: FAMILY MEDICINE | Facility: CLINIC | Age: 41
End: 2021-11-18
Payer: COMMERCIAL

## 2021-11-18 VITALS
DIASTOLIC BLOOD PRESSURE: 84 MMHG | HEART RATE: 58 BPM | OXYGEN SATURATION: 97 % | BODY MASS INDEX: 38.53 KG/M2 | WEIGHT: 268.5 LBS | SYSTOLIC BLOOD PRESSURE: 130 MMHG | TEMPERATURE: 98 F

## 2021-11-18 DIAGNOSIS — Z23 NEED FOR INFLUENZA VACCINATION: ICD-10-CM

## 2021-11-18 DIAGNOSIS — R35.0 URINARY FREQUENCY: Primary | ICD-10-CM

## 2021-11-18 DIAGNOSIS — E66.01 CLASS 2 SEVERE OBESITY WITH SERIOUS COMORBIDITY AND BODY MASS INDEX (BMI) OF 38.0 TO 38.9 IN ADULT, UNSPECIFIED OBESITY TYPE: ICD-10-CM

## 2021-11-18 DIAGNOSIS — E78.00 HYPERCHOLESTEREMIA: ICD-10-CM

## 2021-11-18 DIAGNOSIS — R59.9 LYMPH NODE ENLARGEMENT: ICD-10-CM

## 2021-11-18 PROCEDURE — 90471 FLU VACCINE (QUAD) GREATER THAN OR EQUAL TO 3YO PRESERVATIVE FREE IM: ICD-10-PCS | Mod: S$GLB,,, | Performed by: INTERNAL MEDICINE

## 2021-11-18 PROCEDURE — 99214 OFFICE O/P EST MOD 30 MIN: CPT | Mod: 25,S$GLB,, | Performed by: INTERNAL MEDICINE

## 2021-11-18 PROCEDURE — 90686 IIV4 VACC NO PRSV 0.5 ML IM: CPT | Mod: S$GLB,,, | Performed by: INTERNAL MEDICINE

## 2021-11-18 PROCEDURE — 3075F SYST BP GE 130 - 139MM HG: CPT | Mod: CPTII,S$GLB,, | Performed by: INTERNAL MEDICINE

## 2021-11-18 PROCEDURE — 99214 PR OFFICE/OUTPT VISIT, EST, LEVL IV, 30-39 MIN: ICD-10-PCS | Mod: 25,S$GLB,, | Performed by: INTERNAL MEDICINE

## 2021-11-18 PROCEDURE — 90686 FLU VACCINE (QUAD) GREATER THAN OR EQUAL TO 3YO PRESERVATIVE FREE IM: ICD-10-PCS | Mod: S$GLB,,, | Performed by: INTERNAL MEDICINE

## 2021-11-18 PROCEDURE — 1159F MED LIST DOCD IN RCRD: CPT | Mod: CPTII,S$GLB,, | Performed by: INTERNAL MEDICINE

## 2021-11-18 PROCEDURE — 3008F PR BODY MASS INDEX (BMI) DOCUMENTED: ICD-10-PCS | Mod: CPTII,S$GLB,, | Performed by: INTERNAL MEDICINE

## 2021-11-18 PROCEDURE — 3079F DIAST BP 80-89 MM HG: CPT | Mod: CPTII,S$GLB,, | Performed by: INTERNAL MEDICINE

## 2021-11-18 PROCEDURE — 3079F PR MOST RECENT DIASTOLIC BLOOD PRESSURE 80-89 MM HG: ICD-10-PCS | Mod: CPTII,S$GLB,, | Performed by: INTERNAL MEDICINE

## 2021-11-18 PROCEDURE — 3075F PR MOST RECENT SYSTOLIC BLOOD PRESS GE 130-139MM HG: ICD-10-PCS | Mod: CPTII,S$GLB,, | Performed by: INTERNAL MEDICINE

## 2021-11-18 PROCEDURE — 99999 PR PBB SHADOW E&M-EST. PATIENT-LVL III: ICD-10-PCS | Mod: PBBFAC,,, | Performed by: INTERNAL MEDICINE

## 2021-11-18 PROCEDURE — 90471 IMMUNIZATION ADMIN: CPT | Mod: S$GLB,,, | Performed by: INTERNAL MEDICINE

## 2021-11-18 PROCEDURE — 3008F BODY MASS INDEX DOCD: CPT | Mod: CPTII,S$GLB,, | Performed by: INTERNAL MEDICINE

## 2021-11-18 PROCEDURE — 99999 PR PBB SHADOW E&M-EST. PATIENT-LVL III: CPT | Mod: PBBFAC,,, | Performed by: INTERNAL MEDICINE

## 2021-11-18 PROCEDURE — 1159F PR MEDICATION LIST DOCUMENTED IN MEDICAL RECORD: ICD-10-PCS | Mod: CPTII,S$GLB,, | Performed by: INTERNAL MEDICINE

## 2021-11-18 RX ORDER — PHENTERMINE HYDROCHLORIDE 37.5 MG/1
37.5 TABLET ORAL
Qty: 30 TABLET | Refills: 0 | Status: SHIPPED | OUTPATIENT
Start: 2021-11-18 | End: 2021-12-17 | Stop reason: SDUPTHER

## 2021-12-17 ENCOUNTER — PATIENT MESSAGE (OUTPATIENT)
Dept: FAMILY MEDICINE | Facility: CLINIC | Age: 41
End: 2021-12-17
Payer: COMMERCIAL

## 2021-12-17 DIAGNOSIS — E66.01 CLASS 2 SEVERE OBESITY WITH SERIOUS COMORBIDITY AND BODY MASS INDEX (BMI) OF 38.0 TO 38.9 IN ADULT, UNSPECIFIED OBESITY TYPE: ICD-10-CM

## 2021-12-17 RX ORDER — PHENTERMINE HYDROCHLORIDE 37.5 MG/1
37.5 TABLET ORAL
Qty: 30 TABLET | Refills: 0 | Status: SHIPPED | OUTPATIENT
Start: 2021-12-17 | End: 2022-01-11 | Stop reason: SDUPTHER

## 2022-01-02 ENCOUNTER — PATIENT MESSAGE (OUTPATIENT)
Dept: DERMATOLOGY | Facility: CLINIC | Age: 42
End: 2022-01-02
Payer: COMMERCIAL

## 2022-01-03 DIAGNOSIS — L98.9 DISEASE OF SKIN AND SUBCUTANEOUS TISSUE: ICD-10-CM

## 2022-01-04 RX ORDER — FLUOCINONIDE 0.5 MG/G
CREAM TOPICAL 2 TIMES DAILY
Qty: 60 G | Refills: 0 | Status: SHIPPED | OUTPATIENT
Start: 2022-01-04 | End: 2023-03-10

## 2022-01-11 ENCOUNTER — PATIENT MESSAGE (OUTPATIENT)
Dept: FAMILY MEDICINE | Facility: CLINIC | Age: 42
End: 2022-01-11
Payer: COMMERCIAL

## 2022-01-11 DIAGNOSIS — E66.01 CLASS 2 SEVERE OBESITY WITH SERIOUS COMORBIDITY AND BODY MASS INDEX (BMI) OF 38.0 TO 38.9 IN ADULT, UNSPECIFIED OBESITY TYPE: ICD-10-CM

## 2022-01-11 RX ORDER — PHENTERMINE HYDROCHLORIDE 37.5 MG/1
37.5 TABLET ORAL
Qty: 30 TABLET | Refills: 0 | Status: SHIPPED | OUTPATIENT
Start: 2022-01-11 | End: 2022-02-11

## 2022-05-09 ENCOUNTER — PATIENT OUTREACH (OUTPATIENT)
Dept: ADMINISTRATIVE | Facility: OTHER | Age: 42
End: 2022-05-09
Payer: COMMERCIAL

## 2022-05-09 NOTE — PROGRESS NOTES
Health Maintenance Due   Topic Date Due    COVID-19 Vaccine (3 - Booster for Moderna series) 09/29/2021     Updates were requested from care everywhere.  Chart was reviewed for overdue Proactive [facility] Encounters (GHULAM) topics (CRS, Breast Cancer Screening, Eye exam)  Health Maintenance has been updated.  LINKS immunization registry triggered.  Immunizations were reconciled.

## 2022-05-10 ENCOUNTER — TELEPHONE (OUTPATIENT)
Dept: SURGERY | Facility: CLINIC | Age: 42
End: 2022-05-10
Payer: COMMERCIAL

## 2022-05-11 ENCOUNTER — OFFICE VISIT (OUTPATIENT)
Dept: SURGERY | Facility: CLINIC | Age: 42
End: 2022-05-11
Payer: COMMERCIAL

## 2022-05-11 VITALS
HEIGHT: 70 IN | BODY MASS INDEX: 35.48 KG/M2 | DIASTOLIC BLOOD PRESSURE: 85 MMHG | HEART RATE: 92 BPM | SYSTOLIC BLOOD PRESSURE: 141 MMHG | WEIGHT: 247.81 LBS

## 2022-05-11 DIAGNOSIS — L29.0 PRURITUS ANI: ICD-10-CM

## 2022-05-11 DIAGNOSIS — K64.5 INTERNAL THROMBOSED HEMORRHOIDS: Primary | ICD-10-CM

## 2022-05-11 PROCEDURE — 3079F PR MOST RECENT DIASTOLIC BLOOD PRESSURE 80-89 MM HG: ICD-10-PCS | Mod: CPTII,S$GLB,, | Performed by: NURSE PRACTITIONER

## 2022-05-11 PROCEDURE — 99203 PR OFFICE/OUTPT VISIT, NEW, LEVL III, 30-44 MIN: ICD-10-PCS | Mod: 25,S$GLB,, | Performed by: NURSE PRACTITIONER

## 2022-05-11 PROCEDURE — 3008F BODY MASS INDEX DOCD: CPT | Mod: CPTII,S$GLB,, | Performed by: NURSE PRACTITIONER

## 2022-05-11 PROCEDURE — 99999 PR PBB SHADOW E&M-EST. PATIENT-LVL III: ICD-10-PCS | Mod: PBBFAC,,, | Performed by: NURSE PRACTITIONER

## 2022-05-11 PROCEDURE — 46600 PR DIAG2STIC A2SCOPY: ICD-10-PCS | Mod: S$GLB,,, | Performed by: NURSE PRACTITIONER

## 2022-05-11 PROCEDURE — 3077F PR MOST RECENT SYSTOLIC BLOOD PRESSURE >= 140 MM HG: ICD-10-PCS | Mod: CPTII,S$GLB,, | Performed by: NURSE PRACTITIONER

## 2022-05-11 PROCEDURE — 99999 PR PBB SHADOW E&M-EST. PATIENT-LVL III: CPT | Mod: PBBFAC,,, | Performed by: NURSE PRACTITIONER

## 2022-05-11 PROCEDURE — 3077F SYST BP >= 140 MM HG: CPT | Mod: CPTII,S$GLB,, | Performed by: NURSE PRACTITIONER

## 2022-05-11 PROCEDURE — 3079F DIAST BP 80-89 MM HG: CPT | Mod: CPTII,S$GLB,, | Performed by: NURSE PRACTITIONER

## 2022-05-11 PROCEDURE — 1160F RVW MEDS BY RX/DR IN RCRD: CPT | Mod: CPTII,S$GLB,, | Performed by: NURSE PRACTITIONER

## 2022-05-11 PROCEDURE — 1160F PR REVIEW ALL MEDS BY PRESCRIBER/CLIN PHARMACIST DOCUMENTED: ICD-10-PCS | Mod: CPTII,S$GLB,, | Performed by: NURSE PRACTITIONER

## 2022-05-11 PROCEDURE — 3008F PR BODY MASS INDEX (BMI) DOCUMENTED: ICD-10-PCS | Mod: CPTII,S$GLB,, | Performed by: NURSE PRACTITIONER

## 2022-05-11 PROCEDURE — 1159F MED LIST DOCD IN RCRD: CPT | Mod: CPTII,S$GLB,, | Performed by: NURSE PRACTITIONER

## 2022-05-11 PROCEDURE — 99203 OFFICE O/P NEW LOW 30 MIN: CPT | Mod: 25,S$GLB,, | Performed by: NURSE PRACTITIONER

## 2022-05-11 PROCEDURE — 1159F PR MEDICATION LIST DOCUMENTED IN MEDICAL RECORD: ICD-10-PCS | Mod: CPTII,S$GLB,, | Performed by: NURSE PRACTITIONER

## 2022-05-11 PROCEDURE — 46600 DIAGNOSTIC ANOSCOPY SPX: CPT | Mod: S$GLB,,, | Performed by: NURSE PRACTITIONER

## 2022-05-11 RX ORDER — HYDROCORTISONE 25 MG/G
CREAM TOPICAL 2 TIMES DAILY
Qty: 30 G | Refills: 2 | Status: SHIPPED | OUTPATIENT
Start: 2022-05-11 | End: 2023-03-10

## 2022-05-11 NOTE — PATIENT INSTRUCTIONS
Anusol cream internally 2x/day for 2 weeks  Critic-Aid AF ointment from [facility] or [facility] 2-4x/day for 2 weeks  Daily fiber supplement like citrucel or fibercon

## 2022-05-11 NOTE — PROGRESS NOTES
"CRS Office Visit History and Physical    Referring Md:   Sharmin Little Md  [address]  [address] [place] [address]    SUBJECTIVE:     Chief Complaint: anal itching, rectal lesion    History of Present Illness:  The patient is new patient to this practice.   Course is as follows:  Patient is a 42 y.o. male with hx of pruritus ani presents with anal discomfort, felt internally while in shower and felt a small nodule .  Denies abdominal pain or unintentional weight loss.   Symptoms have been present for "a few weeks".  Has tried nothing.  Associated bleeding: yes, occasional BRBPR  Previous anorectal procedures: no  confirms straining/prolonged time on toilet with bowel movements.  is not currently taking fiber supplement or stool softener. bm daily. Sometimes smaller volume.   Blood thinners: No    Last Colonoscopy: none  Family history of colorectal cancer or IBD: none.    Review of patient's allergies indicates:  No Known Allergies    Past Medical History:   Diagnosis Date    Chest pain 1/4/2016    Headache      Past Surgical History:   Procedure Laterality Date    BREAST SURGERY      Hormonal growth     Family History   Problem Relation Age of Onset    Diabetes Mother         type 2 diabete    Heart disease Mother         A-Fib , cryoablasion    Hyperlipidemia Mother     Diabetes Father         type 1 diabetes    Early death Father         Diabetic complications    Cancer Paternal Grandfather         Lymphoma    Prostate cancer Neg Hx     Kidney disease Neg Hx      Social History     Tobacco Use    Smoking status: Never Smoker    Smokeless tobacco: Never Used   Substance Use Topics    Alcohol use: Yes     Alcohol/week: 4.0 standard drinks     Types: 4 Cans of beer per week     Comment: socially    Drug use: No        Review of Systems:  Review of Systems   Gastrointestinal: Positive for blood in stool and constipation. Negative for abdominal pain, diarrhea and melena.        " "Anal nodule   Skin: Positive for itching.        anal       OBJECTIVE:     Vital Signs (Most Recent)  Blood Pressure (Abnormal) 141/85 (BP Location: Right arm, Patient Position: Sitting, BP Method: Large (Automatic))   Pulse 92   Height 5' 10" (1.778 m)   Weight 112.4 kg (247 lb 12.8 oz)   Body Mass Index 35.56 kg/m²     Physical Exam:  General: White male in no distress   Neuro: Alert and oriented to person, place, and time.  Moves all extremities.     HEENT: No icterus.  Trachea midline  Respiratory: Respirations are even and unlabored, no cough or audible wheezing  Skin: Warm dry and intact, No visible rashes, no jaundice    Labs reviewed today:  Lab Results   Component Value Date    WBC 7.59 07/27/2021    HGB 15.1 07/27/2021    HCT 44.6 07/27/2021     07/27/2021    CHOL 178 07/27/2021    TRIG 79 07/27/2021    HDL 52 07/27/2021    ALT 33 07/27/2021    AST 34 07/27/2021     07/27/2021    K 4.3 07/27/2021     07/27/2021    CREATININE 1.12 07/27/2021    BUN 18 07/27/2021    CO2 26 07/27/2021    TSH 1.652 12/27/2018    HGBA1C 5.7 01/22/2015       Imaging reviewed today:  none    Endoscopy reviewed today:  none    Anorectal Exam:    Anal Skin: Excoriated, erythema and superficial fissuring to perineum and gluteal cleft    Digital Rectal Exam:  Resting Tone normal  Squeeze normal  Relaxation with bear down present  Mass right anterior later, mobile, nontender small nodule  Rectocele  absent  Tenderness  absent    Anoscopy:  Verbal consent was obtained.   Clear plastic anoscope inserted.    Hemorrhoids  Present; thrombus to right anterior lateral hemorrhoid  Stigmata of bleeding  present  Stigmata of prolapsed  present  Distal rectal mucosa normal    ASSESSMENT/PLAN:     Diagnoses and all orders for this visit:    Internal thrombosed hemorrhoids  -     hydrocortisone (ANUSOL-HC) 2.5 % rectal cream; Place rectally 2 (two) times daily.        The patient was instructed to:  anusol bid for 2 " weeks  Critic aid AF bid for 10 days  Increase water intake to at least 8-10 glasses of water per day.  Take a daily fiber supplement (Konsyl, Benefiber, Metamucil) and increase dietary intake to 20-30 grams/day.  Avoid straining or spending >5min/event with bowel movements.  Follow-up in clinic prn.      DOMONIQUE Riojas  Colon and Rectal Surgery

## 2022-09-22 ENCOUNTER — PATIENT MESSAGE (OUTPATIENT)
Dept: INTERNAL MEDICINE | Facility: CLINIC | Age: 42
End: 2022-09-22
Payer: COMMERCIAL

## 2022-09-23 ENCOUNTER — LAB VISIT (OUTPATIENT)
Dept: LAB | Facility: HOSPITAL | Age: 42
End: 2022-09-23
Attending: NURSE PRACTITIONER
Payer: COMMERCIAL

## 2022-09-23 ENCOUNTER — OFFICE VISIT (OUTPATIENT)
Dept: INTERNAL MEDICINE | Facility: CLINIC | Age: 42
End: 2022-09-23
Payer: COMMERCIAL

## 2022-09-23 VITALS
SYSTOLIC BLOOD PRESSURE: 132 MMHG | HEART RATE: 72 BPM | TEMPERATURE: 98 F | WEIGHT: 261.25 LBS | DIASTOLIC BLOOD PRESSURE: 86 MMHG | OXYGEN SATURATION: 97 % | BODY MASS INDEX: 37.48 KG/M2

## 2022-09-23 DIAGNOSIS — E66.01 CLASS 2 SEVERE OBESITY WITH SERIOUS COMORBIDITY AND BODY MASS INDEX (BMI) OF 38.0 TO 38.9 IN ADULT, UNSPECIFIED OBESITY TYPE: ICD-10-CM

## 2022-09-23 DIAGNOSIS — R53.83 FATIGUE, UNSPECIFIED TYPE: ICD-10-CM

## 2022-09-23 DIAGNOSIS — R53.83 FATIGUE, UNSPECIFIED TYPE: Primary | ICD-10-CM

## 2022-09-23 LAB
ALBUMIN SERPL BCP-MCNC: 4 G/DL (ref 3.5–5.2)
ALP SERPL-CCNC: 50 U/L (ref 55–135)
ALT SERPL W/O P-5'-P-CCNC: 27 U/L (ref 10–44)
ANION GAP SERPL CALC-SCNC: 7 MMOL/L (ref 8–16)
AST SERPL-CCNC: 23 U/L (ref 10–40)
BASOPHILS # BLD AUTO: 0.05 K/UL (ref 0–0.2)
BASOPHILS NFR BLD: 0.7 % (ref 0–1.9)
BILIRUB SERPL-MCNC: 0.6 MG/DL (ref 0.1–1)
BUN SERPL-MCNC: 15 MG/DL (ref 6–20)
CALCIUM SERPL-MCNC: 9.9 MG/DL (ref 8.7–10.5)
CHLORIDE SERPL-SCNC: 102 MMOL/L (ref 95–110)
CO2 SERPL-SCNC: 29 MMOL/L (ref 23–29)
CREAT SERPL-MCNC: 1.5 MG/DL (ref 0.5–1.4)
CTP QC/QA: YES
DIFFERENTIAL METHOD: NORMAL
EOSINOPHIL # BLD AUTO: 0.3 K/UL (ref 0–0.5)
EOSINOPHIL NFR BLD: 4.1 % (ref 0–8)
ERYTHROCYTE [DISTWIDTH] IN BLOOD BY AUTOMATED COUNT: 13.3 % (ref 11.5–14.5)
EST. GFR  (NO RACE VARIABLE): 59.2 ML/MIN/1.73 M^2
GLUCOSE SERPL-MCNC: 84 MG/DL (ref 70–110)
HCT VFR BLD AUTO: 43.6 % (ref 40–54)
HGB BLD-MCNC: 14.5 G/DL (ref 14–18)
IMM GRANULOCYTES # BLD AUTO: 0.03 K/UL (ref 0–0.04)
IMM GRANULOCYTES NFR BLD AUTO: 0.4 % (ref 0–0.5)
LYMPHOCYTES # BLD AUTO: 2.4 K/UL (ref 1–4.8)
LYMPHOCYTES NFR BLD: 33.3 % (ref 18–48)
MCH RBC QN AUTO: 30.5 PG (ref 27–31)
MCHC RBC AUTO-ENTMCNC: 33.3 G/DL (ref 32–36)
MCV RBC AUTO: 92 FL (ref 82–98)
MONOCYTES # BLD AUTO: 0.6 K/UL (ref 0.3–1)
MONOCYTES NFR BLD: 8.3 % (ref 4–15)
NEUTROPHILS # BLD AUTO: 3.9 K/UL (ref 1.8–7.7)
NEUTROPHILS NFR BLD: 53.2 % (ref 38–73)
NRBC BLD-RTO: 0 /100 WBC
PLATELET # BLD AUTO: 287 K/UL (ref 150–450)
PMV BLD AUTO: 10.2 FL (ref 9.2–12.9)
POTASSIUM SERPL-SCNC: 4.1 MMOL/L (ref 3.5–5.1)
PROT SERPL-MCNC: 6.8 G/DL (ref 6–8.4)
RBC # BLD AUTO: 4.75 M/UL (ref 4.6–6.2)
SARS-COV-2 RDRP RESP QL NAA+PROBE: NEGATIVE
SODIUM SERPL-SCNC: 138 MMOL/L (ref 136–145)
T4 FREE SERPL-MCNC: 0.72 NG/DL (ref 0.71–1.51)
TESTOST SERPL-MCNC: 364 NG/DL (ref 304–1227)
TSH SERPL DL<=0.005 MIU/L-ACNC: 1.31 UIU/ML (ref 0.4–4)
WBC # BLD AUTO: 7.32 K/UL (ref 3.9–12.7)

## 2022-09-23 PROCEDURE — 84403 ASSAY OF TOTAL TESTOSTERONE: CPT | Performed by: NURSE PRACTITIONER

## 2022-09-23 PROCEDURE — U0002: ICD-10-PCS | Mod: QW,S$GLB,, | Performed by: NURSE PRACTITIONER

## 2022-09-23 PROCEDURE — 3008F BODY MASS INDEX DOCD: CPT | Mod: CPTII,S$GLB,, | Performed by: NURSE PRACTITIONER

## 2022-09-23 PROCEDURE — 99999 PR PBB SHADOW E&M-EST. PATIENT-LVL III: ICD-10-PCS | Mod: PBBFAC,,, | Performed by: NURSE PRACTITIONER

## 2022-09-23 PROCEDURE — 1159F PR MEDICATION LIST DOCUMENTED IN MEDICAL RECORD: ICD-10-PCS | Mod: CPTII,S$GLB,, | Performed by: NURSE PRACTITIONER

## 2022-09-23 PROCEDURE — 3075F SYST BP GE 130 - 139MM HG: CPT | Mod: CPTII,S$GLB,, | Performed by: NURSE PRACTITIONER

## 2022-09-23 PROCEDURE — 3079F PR MOST RECENT DIASTOLIC BLOOD PRESSURE 80-89 MM HG: ICD-10-PCS | Mod: CPTII,S$GLB,, | Performed by: NURSE PRACTITIONER

## 2022-09-23 PROCEDURE — 3079F DIAST BP 80-89 MM HG: CPT | Mod: CPTII,S$GLB,, | Performed by: NURSE PRACTITIONER

## 2022-09-23 PROCEDURE — U0002 COVID-19 LAB TEST NON-CDC: HCPCS | Mod: QW,S$GLB,, | Performed by: NURSE PRACTITIONER

## 2022-09-23 PROCEDURE — 36415 COLL VENOUS BLD VENIPUNCTURE: CPT | Performed by: NURSE PRACTITIONER

## 2022-09-23 PROCEDURE — 84439 ASSAY OF FREE THYROXINE: CPT | Performed by: NURSE PRACTITIONER

## 2022-09-23 PROCEDURE — 99214 PR OFFICE/OUTPT VISIT, EST, LEVL IV, 30-39 MIN: ICD-10-PCS | Mod: S$GLB,,, | Performed by: NURSE PRACTITIONER

## 2022-09-23 PROCEDURE — 3008F PR BODY MASS INDEX (BMI) DOCUMENTED: ICD-10-PCS | Mod: CPTII,S$GLB,, | Performed by: NURSE PRACTITIONER

## 2022-09-23 PROCEDURE — 80053 COMPREHEN METABOLIC PANEL: CPT | Performed by: NURSE PRACTITIONER

## 2022-09-23 PROCEDURE — 3075F PR MOST RECENT SYSTOLIC BLOOD PRESS GE 130-139MM HG: ICD-10-PCS | Mod: CPTII,S$GLB,, | Performed by: NURSE PRACTITIONER

## 2022-09-23 PROCEDURE — 84443 ASSAY THYROID STIM HORMONE: CPT | Performed by: NURSE PRACTITIONER

## 2022-09-23 PROCEDURE — 99999 PR PBB SHADOW E&M-EST. PATIENT-LVL III: CPT | Mod: PBBFAC,,, | Performed by: NURSE PRACTITIONER

## 2022-09-23 PROCEDURE — 99214 OFFICE O/P EST MOD 30 MIN: CPT | Mod: S$GLB,,, | Performed by: NURSE PRACTITIONER

## 2022-09-23 PROCEDURE — 85025 COMPLETE CBC W/AUTO DIFF WBC: CPT | Performed by: NURSE PRACTITIONER

## 2022-09-23 PROCEDURE — 1159F MED LIST DOCD IN RCRD: CPT | Mod: CPTII,S$GLB,, | Performed by: NURSE PRACTITIONER

## 2022-09-23 NOTE — PROGRESS NOTES
[facility] Note    Chief Complaint      Chief Complaint   Patient presents with    Fatigue     X 3 weeks     History of Present Illness      Gallito Wayne is a 42 y.o. male patient of Dr. Moore who presents today for c/o lethargy over the past 3 weeks. [place] reports [place] and when he came home this last time, he has been so tired. No c/o urti, cough, fever, sob, cp, n/v/d, rash, or problems with bowel or bladder function.     Poct covid negative  Labs ordered      Health Maintenance   Topic Date Due    TETANUS VACCINE  01/04/2026    Lipid Panel  07/27/2026    Hepatitis C Screening  Completed       Past Medical History:   Diagnosis Date    Chest pain 1/4/2016    Headache        Past Surgical History:   Procedure Laterality Date    BREAST SURGERY      Hormonal growth    EYE SURGERY  2009    Lasix       family history includes Cancer in his paternal grandfather; Diabetes in his father and mother; Early death in his father; Heart disease in his mother; Hyperlipidemia in his mother.     Social History     Tobacco Use    Smoking status: Never    Smokeless tobacco: Never   Substance Use Topics    Alcohol use: Yes     Alcohol/week: 4.0 standard drinks     Types: 4 Cans of beer per week     Comment: socially    Drug use: No       Review of Systems   Constitutional:  Positive for malaise/fatigue. Negative for chills and fever.   HENT:  Negative for congestion.    Eyes:  Negative for blurred vision.   Respiratory:  Negative for cough and shortness of breath.    Cardiovascular:  Negative for chest pain and palpitations.   Gastrointestinal:  Negative for abdominal pain, diarrhea, nausea and vomiting.   Genitourinary:  Negative for dysuria.   Musculoskeletal:  Negative for myalgias.   Neurological:  Negative for dizziness, weakness and headaches.      Outpatient Encounter Medications as of 9/23/2022   Medication Sig Dispense Refill    cetirizine (ZYRTEC) 10 MG tablet Take 10 mg by  mouth once daily.      fexofenadine (ALLEGRA) 180 MG tablet Take 180 mg by mouth once daily.      hydrocortisone (ANUSOL-HC) 2.5 % rectal cream Place rectally 2 (two) times daily. 30 g 2    fluocinonide 0.05% (LIDEX) 0.05 % cream Apply topically 2 (two) times daily to affected areas on the body for 2 to 4 weeks per course (Patient not taking: No sig reported) 60 g 0     No facility-administered encounter medications on file as of 9/23/2022.       Review of patient's allergies indicates:  No Known Allergies    Physical Exam      Vital Signs  Temp: 98 °F (36.7 °C)  Pulse: 72  SpO2: 97 %  BP: 132/86  Height and Weight  Weight: 118.5 kg (261 lb 3.9 oz)    Physical Exam  Vitals and nursing note reviewed.   Constitutional:       General: He is not in acute distress.     Appearance: Normal appearance. He is not ill-appearing.   HENT:      Head: Normocephalic and atraumatic.   Eyes:      Pupils: Pupils are equal, round, and reactive to light.   Cardiovascular:      Rate and Rhythm: Normal rate and regular rhythm.      Heart sounds: Normal heart sounds.   Pulmonary:      Effort: Pulmonary effort is normal. No respiratory distress.      Breath sounds: Normal breath sounds.   Musculoskeletal:         General: Normal range of motion.      Cervical back: Normal range of motion.   Skin:     General: Skin is warm and dry.   Neurological:      General: No focal deficit present.      Mental Status: He is alert and oriented to person, place, and time.   Psychiatric:         Mood and Affect: Mood normal.         Behavior: Behavior normal.         Thought Content: Thought content normal.         Judgment: Judgment normal.        Laboratory:  CBC:  Lab Results   Component Value Date    WBC 7.59 07/27/2021    RBC 5.00 07/27/2021    HGB 15.1 07/27/2021    HCT 44.6 07/27/2021     07/27/2021    MCV 89 07/27/2021    MCH 30.2 07/27/2021    MCHC 33.9 07/27/2021    MCHC 34.4 08/21/2019    MCHC 32.5 12/27/2018     CMP:  Lab Results    Component Value Date     07/27/2021    CALCIUM 9.1 07/27/2021    ALBUMIN 4.3 07/27/2021    PROT 7.1 07/27/2021     07/27/2021    K 4.3 07/27/2021    CO2 26 07/27/2021     07/27/2021    BUN 18 07/27/2021    ALKPHOS 52 07/27/2021    ALT 33 07/27/2021    AST 34 07/27/2021    BILITOT 0.6 07/27/2021    BILITOT 0.5 08/21/2019    BILITOT 0.6 05/03/2019     URINALYSIS:  Lab Results   Component Value Date    COLORU Yellow 11/18/2021    SPECGRAV 1.015 11/18/2021    PHUR 7.0 11/18/2021    PROTEINUA Negative 11/18/2021    NITRITE Negative 11/18/2021    LEUKOCYTESUR Negative 11/18/2021    UROBILINOGEN Negative 11/18/2021      LIPIDS:  Lab Results   Component Value Date    TSH 1.652 12/27/2018    TSH 2.075 08/19/2017    TSH 2.094 01/06/2016    HDL 52 07/27/2021    HDL 45 08/21/2019    HDL 57 12/27/2018    CHOL 178 07/27/2021    CHOL 159 08/21/2019    CHOL 207 (H) 12/27/2018    TRIG 79 07/27/2021    TRIG 68 08/21/2019    TRIG 135 12/27/2018    LDLCALC 110.2 07/27/2021    LDLCALC 100.4 08/21/2019    LDLCALC 123.0 12/27/2018    CHOLHDL 29.2 07/27/2021    CHOLHDL 28.3 08/21/2019    CHOLHDL 27.5 12/27/2018    NONHDLCHOL 126 07/27/2021    NONHDLCHOL 114 08/21/2019    NONHDLCHOL 150 12/27/2018    TOTALCHOLEST 3.4 07/27/2021    TOTALCHOLEST 3.5 08/21/2019    TOTALCHOLEST 3.6 12/27/2018     TSH:  Lab Results   Component Value Date    TSH 1.652 12/27/2018    TSH 2.075 08/19/2017    TSH 2.094 01/06/2016     A1C:  Lab Results   Component Value Date    HGBA1C 5.7 01/22/2015         Assessment/Plan     Gallito Wayne is a 42 y.o.male with:    Fatigue, unspecified type  -     CBC Auto Differential; Future; Expected date: 09/23/2022  -     Comprehensive Metabolic Panel; Future; Expected date: 09/23/2022  -     TSH; Future; Expected date: 09/23/2022  -     T4, Free; Future; Expected date: 09/23/2022  -     TESTOSTERONE; Future; Expected date: 09/23/2022  -     POCT COVID-19 Rapid Screening    Class 2 severe obesity  with serious comorbidity and body mass index (BMI) of 38.0 to 38.9 in adult, unspecified obesity type  -     CBC Auto Differential; Future; Expected date: 09/23/2022  -     Comprehensive Metabolic Panel; Future; Expected date: 09/23/2022  -     TSH; Future; Expected date: 09/23/2022  -     T4, Free; Future; Expected date: 09/23/2022  -     TESTOSTERONE; Future; Expected date: 09/23/2022     Stay hydrated with water  Eat healthy diet consisting of veggies, baked or broiled meat. Low in carbs and sugars  Try to get in 30 minutes of exercise 3-4 times per week.     I spent 35 minutes on the day of this encounter for preparing for, evaluating, treating, and managing this patient.        -Continue current medications and maintain follow up with specialists.  Return to clinic as needed for any concerns  No follow-ups on file.      Erin Jiminez, NP-C [facility]

## 2022-09-26 ENCOUNTER — PATIENT MESSAGE (OUTPATIENT)
Dept: INTERNAL MEDICINE | Facility: CLINIC | Age: 42
End: 2022-09-26
Payer: COMMERCIAL

## 2022-09-26 DIAGNOSIS — N17.9 ACUTE KIDNEY INJURY: Primary | ICD-10-CM

## 2022-09-26 NOTE — TELEPHONE ENCOUNTER
This is your patient and Kacy ordered labs on him and his asking about his creatinine levels and EGFR levels. Can you take a look at these to see if anything else needs to be done.

## 2023-03-10 ENCOUNTER — OFFICE VISIT (OUTPATIENT)
Dept: PRIMARY CARE CLINIC | Facility: CLINIC | Age: 43
End: 2023-03-10
Payer: COMMERCIAL

## 2023-03-10 ENCOUNTER — LAB VISIT (OUTPATIENT)
Dept: LAB | Facility: HOSPITAL | Age: 43
End: 2023-03-10
Attending: INTERNAL MEDICINE
Payer: COMMERCIAL

## 2023-03-10 VITALS
WEIGHT: 264.56 LBS | HEART RATE: 54 BPM | SYSTOLIC BLOOD PRESSURE: 130 MMHG | BODY MASS INDEX: 37.96 KG/M2 | DIASTOLIC BLOOD PRESSURE: 82 MMHG | OXYGEN SATURATION: 100 %

## 2023-03-10 DIAGNOSIS — E78.00 HYPERCHOLESTEREMIA: ICD-10-CM

## 2023-03-10 DIAGNOSIS — Z00.00 ANNUAL PHYSICAL EXAM: Primary | ICD-10-CM

## 2023-03-10 DIAGNOSIS — R82.90 ABNORMAL URINE ODOR: ICD-10-CM

## 2023-03-10 DIAGNOSIS — E66.01 CLASS 2 SEVERE OBESITY WITH SERIOUS COMORBIDITY AND BODY MASS INDEX (BMI) OF 38.0 TO 38.9 IN ADULT, UNSPECIFIED OBESITY TYPE: ICD-10-CM

## 2023-03-10 DIAGNOSIS — L40.9 PSORIASIS: ICD-10-CM

## 2023-03-10 LAB
BILIRUB UR QL STRIP: NEGATIVE
CLARITY UR REFRACT.AUTO: CLEAR
COLOR UR AUTO: COLORLESS
GLUCOSE UR QL STRIP: NEGATIVE
HGB UR QL STRIP: NEGATIVE
KETONES UR QL STRIP: NEGATIVE
LEUKOCYTE ESTERASE UR QL STRIP: NEGATIVE
NITRITE UR QL STRIP: NEGATIVE
PH UR STRIP: 7 [PH] (ref 5–8)
PROT UR QL STRIP: NEGATIVE
SP GR UR STRIP: 1.01 (ref 1–1.03)
URN SPEC COLLECT METH UR: ABNORMAL

## 2023-03-10 PROCEDURE — 99999 PR PBB SHADOW E&M-EST. PATIENT-LVL III: CPT | Mod: PBBFAC,,, | Performed by: INTERNAL MEDICINE

## 2023-03-10 PROCEDURE — 3075F PR MOST RECENT SYSTOLIC BLOOD PRESS GE 130-139MM HG: ICD-10-PCS | Mod: CPTII,S$GLB,, | Performed by: INTERNAL MEDICINE

## 2023-03-10 PROCEDURE — 1159F MED LIST DOCD IN RCRD: CPT | Mod: CPTII,S$GLB,, | Performed by: INTERNAL MEDICINE

## 2023-03-10 PROCEDURE — 99396 PREV VISIT EST AGE 40-64: CPT | Mod: S$GLB,,, | Performed by: INTERNAL MEDICINE

## 2023-03-10 PROCEDURE — 1159F PR MEDICATION LIST DOCUMENTED IN MEDICAL RECORD: ICD-10-PCS | Mod: CPTII,S$GLB,, | Performed by: INTERNAL MEDICINE

## 2023-03-10 PROCEDURE — 3008F BODY MASS INDEX DOCD: CPT | Mod: CPTII,S$GLB,, | Performed by: INTERNAL MEDICINE

## 2023-03-10 PROCEDURE — 3079F DIAST BP 80-89 MM HG: CPT | Mod: CPTII,S$GLB,, | Performed by: INTERNAL MEDICINE

## 2023-03-10 PROCEDURE — 3075F SYST BP GE 130 - 139MM HG: CPT | Mod: CPTII,S$GLB,, | Performed by: INTERNAL MEDICINE

## 2023-03-10 PROCEDURE — 81003 URINALYSIS AUTO W/O SCOPE: CPT | Performed by: INTERNAL MEDICINE

## 2023-03-10 PROCEDURE — 3079F PR MOST RECENT DIASTOLIC BLOOD PRESSURE 80-89 MM HG: ICD-10-PCS | Mod: CPTII,S$GLB,, | Performed by: INTERNAL MEDICINE

## 2023-03-10 PROCEDURE — 3008F PR BODY MASS INDEX (BMI) DOCUMENTED: ICD-10-PCS | Mod: CPTII,S$GLB,, | Performed by: INTERNAL MEDICINE

## 2023-03-10 PROCEDURE — 99999 PR PBB SHADOW E&M-EST. PATIENT-LVL III: ICD-10-PCS | Mod: PBBFAC,,, | Performed by: INTERNAL MEDICINE

## 2023-03-10 PROCEDURE — 99396 PR PREVENTIVE VISIT,EST,40-64: ICD-10-PCS | Mod: S$GLB,,, | Performed by: INTERNAL MEDICINE

## 2023-03-10 NOTE — PROGRESS NOTES
[facility] Note    Chief Complaint      Chief Complaint   Patient presents with    Annual Exam     History of Present Illness      Gallito Wayne is a 43 y.o. male who presents today for Annual preventative visit.  Patient comes to appointment alone.    Occasionally has foul odor to urine along with haziness.  No dysuria/hematuria.  Drinks lots of water.    Problem List Items Addressed This Visit       Hypercholesteremia    Current Assessment & Plan     Cholesterol normal on 7/2021 labs.  The 10-year ASCVD risk score (Jovi PATEL, et al., 2019) is: 1.3%    Values used to calculate the score:      Age: 43 years      Sex: Male      Is Non- : No      Diabetic: No      Tobacco smoker: No      Systolic Blood Pressure: 132 mmHg      Is BP treated: No      HDL Cholesterol: 52 mg/dL      Total Cholesterol: 178 mg/dL           Class 2 obesity due to excess calories without serious comorbidity with body mass index (BMI) of 37.0 to 37.9 in adult    Current Assessment & Plan     Exercising 2 times per week, just bought a property in [place] so has been cleaning this up. Diet has been hit or miss.         Psoriasis    Current Assessment & Plan     Stable on topical creams, no issues at present.  Doing well.          Other Visit Diagnoses       Annual physical exam    -  Primary    Relevant Orders    CBC Auto Differential    Lipid Panel    Comprehensive Metabolic Panel    Hemoglobin A1C    Abnormal urine odor        Relevant Orders    Urinalysis, Reflex to Urine Culture Urine, Clean Catch            Health Maintenance   Topic Date Due    TETANUS VACCINE  01/04/2026    Lipid Panel  07/27/2026    Hepatitis C Screening  Completed       Past Medical History:   Diagnosis Date    Chest pain 1/4/2016    Headache        Past Surgical History:   Procedure Laterality Date    BREAST SURGERY      Hormonal growth    EYE SURGERY  2009    Lasix       family history includes Cancer in his  paternal grandfather; Diabetes in his father and mother; Early death in his father; Heart disease in his mother; Hyperlipidemia in his mother.    Social History     Tobacco Use    Smoking status: Never    Smokeless tobacco: Never   Substance Use Topics    Alcohol use: Yes     Alcohol/week: 4.0 standard drinks     Types: 4 Cans of beer per week     Comment: socially    Drug use: No       ROS     Outpatient Encounter Medications as of 3/10/2023   Medication Sig Dispense Refill    cetirizine (ZYRTEC) 10 MG tablet Take 10 mg by mouth once daily.      fexofenadine (ALLEGRA) 180 MG tablet Take 180 mg by mouth once daily.      [DISCONTINUED] fluocinonide 0.05% (LIDEX) 0.05 % cream Apply topically 2 (two) times daily to affected areas on the body for 2 to 4 weeks per course (Patient not taking: Reported on 5/11/2022) 60 g 0    [DISCONTINUED] hydrocortisone (ANUSOL-HC) 2.5 % rectal cream Place rectally 2 (two) times daily. (Patient not taking: Reported on 3/10/2023) 30 g 2     No facility-administered encounter medications on file as of 3/10/2023.        Review of patient's allergies indicates:  No Known Allergies    Physical Exam      Vital Signs  Pulse: (!) 54  SpO2: 100 %  BP: 130/82  Pain Score: 0-No pain  Height and Weight  Weight: 120 kg (264 lb 8.8 oz)]    Physical Exam     Laboratory:  CBC:  No results for input(s): WBC, RBC, HGB, HCT, PLT, MCV, MCH, MCHC in the last 2160 hours.  CMP:  No results for input(s): GLU, CALCIUM, ALBUMIN, PROT, NA, K, CO2, CL, BUN, ALKPHOS, ALT, AST, BILITOT in the last 2160 hours.    Invalid input(s): CREATININ  URINALYSIS:  No results for input(s): COLORU, CLARITYU, SPECGRAV, PHUR, PROTEINUA, GLUCOSEU, BILIRUBINCON, BLOODU, WBCU, RBCU, BACTERIA, MUCUS, NITRITE, LEUKOCYTESUR, UROBILINOGEN, HYALINECASTS in the last 2160 hours.   LIPIDS:  No results for input(s): TSH, HDL, CHOL, TRIG, LDLCALC, CHOLHDL, NONHDLCHOL, TOTALCHOLEST in the last 2160 hours.  TSH:  No results for input(s): TSH in  the last 2160 hours.  A1C:  No results for input(s): HGBA1C in the last 2160 hours.    Radiology:  No results found in the last 30 days.     Assessment/Plan     Gallito Wayne is a 43 y.o.male with:    1. Annual physical exam  - CBC Auto Differential; Future  - Lipid Panel; Future  - Comprehensive Metabolic Panel; Future  - Hemoglobin A1C; Future    2. Hypercholesteremia    3. Psoriasis    4. Class 2 severe obesity with serious comorbidity and body mass index (BMI) of 38.0 to 38.9 in adult, unspecified obesity type    5. Abnormal urine odor  - Urinalysis, Reflex to Urine Culture Urine, Clean Catch; Future    -labs ordered  -declines COVID booster  -Continue current medications and maintain follow up with specialists.    -Follow up in about 6 months (around 9/10/2023) for follow up of medical problems.       Sharmin Little MD  [facility]

## 2023-03-10 NOTE — ASSESSMENT & PLAN NOTE
Exercising 2 times per week, just bought a property in [place] so has been cleaning this up. Diet has been hit or miss.

## 2023-03-10 NOTE — ASSESSMENT & PLAN NOTE
Cholesterol normal on 7/2021 labs.  The 10-year ASCVD risk score (Jovi PATEL, et al., 2019) is: 1.3%    Values used to calculate the score:      Age: 43 years      Sex: Male      Is Non- : No      Diabetic: No      Tobacco smoker: No      Systolic Blood Pressure: 132 mmHg      Is BP treated: No      HDL Cholesterol: 52 mg/dL      Total Cholesterol: 178 mg/dL

## 2023-03-13 ENCOUNTER — PATIENT MESSAGE (OUTPATIENT)
Dept: PRIMARY CARE CLINIC | Facility: CLINIC | Age: 43
End: 2023-03-13
Payer: COMMERCIAL

## 2023-03-13 RX ORDER — PHENTERMINE HYDROCHLORIDE 37.5 MG/1
37.5 TABLET ORAL
Qty: 30 TABLET | Refills: 0 | Status: SHIPPED | OUTPATIENT
Start: 2023-03-13 | End: 2023-03-21 | Stop reason: SDUPTHER

## 2023-05-05 ENCOUNTER — OFFICE VISIT (OUTPATIENT)
Dept: FAMILY MEDICINE | Facility: CLINIC | Age: 43
End: 2023-05-05
Payer: COMMERCIAL

## 2023-05-05 DIAGNOSIS — R10.11 RIGHT UPPER QUADRANT ABDOMINAL PAIN: Primary | ICD-10-CM

## 2023-05-05 PROCEDURE — 1160F PR REVIEW ALL MEDS BY PRESCRIBER/CLIN PHARMACIST DOCUMENTED: ICD-10-PCS | Mod: CPTII,95,, | Performed by: FAMILY MEDICINE

## 2023-05-05 PROCEDURE — 1159F MED LIST DOCD IN RCRD: CPT | Mod: CPTII,95,, | Performed by: FAMILY MEDICINE

## 2023-05-05 PROCEDURE — 3044F PR MOST RECENT HEMOGLOBIN A1C LEVEL <7.0%: ICD-10-PCS | Mod: CPTII,95,, | Performed by: FAMILY MEDICINE

## 2023-05-05 PROCEDURE — 99212 OFFICE O/P EST SF 10 MIN: CPT | Mod: 95,,, | Performed by: FAMILY MEDICINE

## 2023-05-05 PROCEDURE — 99212 PR OFFICE/OUTPT VISIT, EST, LEVL II, 10-19 MIN: ICD-10-PCS | Mod: 95,,, | Performed by: FAMILY MEDICINE

## 2023-05-05 PROCEDURE — 1160F RVW MEDS BY RX/DR IN RCRD: CPT | Mod: CPTII,95,, | Performed by: FAMILY MEDICINE

## 2023-05-05 PROCEDURE — 1159F PR MEDICATION LIST DOCUMENTED IN MEDICAL RECORD: ICD-10-PCS | Mod: CPTII,95,, | Performed by: FAMILY MEDICINE

## 2023-05-05 PROCEDURE — 3044F HG A1C LEVEL LT 7.0%: CPT | Mod: CPTII,95,, | Performed by: FAMILY MEDICINE

## 2023-05-05 NOTE — PROGRESS NOTES
The patient location is: [place]  The chief complaint leading to consultation is: abdominal pain    Visit type: audiovisual    Face to Face time with patient: 15 minutes of total time spent on the encounter, which includes face to face time and non-face to face time preparing to see the patient (eg, review of tests), Obtaining and/or reviewing separately obtained history, Documenting clinical information in the electronic or other health record, Independently interpreting results (not separately reported) and communicating results to the patient/family/caregiver, or Care coordination (not separately reported).         Each patient to whom he or she provides medical services by telemedicine is:  (1) informed of the relationship between the physician and patient and the respective role of any other health care provider with respect to management of the patient; and (2) notified that he or she may decline to receive medical services by telemedicine and may withdraw from such care at any time.    Notes:  43 years old male came to the clinic with right upper quadrant abdominal pain for the last 36 hours.  No nausea, vomiting or diarrhea.  No fever.  Patient came after recent international travel in [place].    Diagnoses and all orders for this visit:    Right upper quadrant abdominal pain  -     US Abdomen Complete; Future  -     CBC Auto Differential; Future  -     Comprehensive Metabolic Panel; Future  -     Urinalysis; Future  -     Amylase; Future  -     Lipase; Future       ER precautions were given.  If symptoms are getting worse please consider emergency room evaluation.  Answers submitted by the patient for this visit:  Abdominal Pain Questionnaire (Submitted on 5/5/2023)  Chief Complaint: Abdominal pain  Chronicity: new  Onset: yesterday  Onset quality: sudden  Frequency: constantly  Episode duration: 36 Hours  Progression since onset: waxing and waning  Pain location: RUQ  Pain - numeric: 7/10  Pain  quality: sharp  Radiates to: chest  anorexia: No  arthralgias: No  belching: No  constipation: No  diarrhea: No  dysuria: No  fever: No  flatus: Yes  frequency: Yes  headaches: No  hematochezia: No  hematuria: No  melena: No  myalgias: No  nausea: No  weight loss: No  vomiting: No  Aggravated by: deep breathing, movement  Relieved by: being still  Pain severity: moderate  Treatments tried: antacids  Improvement on treatment: no relief  abdominal surgery: No  colon cancer: No  Crohn's disease: No  gallstones: No  GERD: Yes  irritable bowel syndrome: No  kidney stones: No  pancreatitis: No  PUD: No  ulcerative colitis: No  UTI: No

## 2023-05-30 ENCOUNTER — PATIENT MESSAGE (OUTPATIENT)
Dept: PRIMARY CARE CLINIC | Facility: CLINIC | Age: 43
End: 2023-05-30
Payer: COMMERCIAL

## 2023-05-30 RX ORDER — PHENTERMINE HYDROCHLORIDE 37.5 MG/1
37.5 TABLET ORAL
Qty: 30 TABLET | Refills: 0 | Status: SHIPPED | OUTPATIENT
Start: 2023-05-30 | End: 2023-06-30

## 2023-11-28 ENCOUNTER — OFFICE VISIT (OUTPATIENT)
Dept: DERMATOLOGY | Facility: CLINIC | Age: 43
End: 2023-11-28
Payer: COMMERCIAL

## 2023-11-28 VITALS — WEIGHT: 264 LBS | BODY MASS INDEX: 37.88 KG/M2

## 2023-11-28 DIAGNOSIS — L81.4 LENTIGINES: ICD-10-CM

## 2023-11-28 DIAGNOSIS — Z12.83 SKIN EXAM, SCREENING FOR CANCER: ICD-10-CM

## 2023-11-28 DIAGNOSIS — D22.9 NEVUS OF MULTIPLE SITES: ICD-10-CM

## 2023-11-28 DIAGNOSIS — L82.1 SEBORRHEIC KERATOSES: Primary | ICD-10-CM

## 2023-11-28 PROCEDURE — 1160F PR REVIEW ALL MEDS BY PRESCRIBER/CLIN PHARMACIST DOCUMENTED: ICD-10-PCS | Mod: CPTII,S$GLB,, | Performed by: DERMATOLOGY

## 2023-11-28 PROCEDURE — 1159F PR MEDICATION LIST DOCUMENTED IN MEDICAL RECORD: ICD-10-PCS | Mod: CPTII,S$GLB,, | Performed by: DERMATOLOGY

## 2023-11-28 PROCEDURE — 99213 OFFICE O/P EST LOW 20 MIN: CPT | Mod: S$GLB,,, | Performed by: DERMATOLOGY

## 2023-11-28 PROCEDURE — 1160F RVW MEDS BY RX/DR IN RCRD: CPT | Mod: CPTII,S$GLB,, | Performed by: DERMATOLOGY

## 2023-11-28 PROCEDURE — 3044F PR MOST RECENT HEMOGLOBIN A1C LEVEL <7.0%: ICD-10-PCS | Mod: CPTII,S$GLB,, | Performed by: DERMATOLOGY

## 2023-11-28 PROCEDURE — 3008F BODY MASS INDEX DOCD: CPT | Mod: CPTII,S$GLB,, | Performed by: DERMATOLOGY

## 2023-11-28 PROCEDURE — 99999 PR PBB SHADOW E&M-EST. PATIENT-LVL III: ICD-10-PCS | Mod: PBBFAC,,, | Performed by: DERMATOLOGY

## 2023-11-28 PROCEDURE — 99999 PR PBB SHADOW E&M-EST. PATIENT-LVL III: CPT | Mod: PBBFAC,,, | Performed by: DERMATOLOGY

## 2023-11-28 PROCEDURE — 99213 PR OFFICE/OUTPT VISIT, EST, LEVL III, 20-29 MIN: ICD-10-PCS | Mod: S$GLB,,, | Performed by: DERMATOLOGY

## 2023-11-28 PROCEDURE — 3008F PR BODY MASS INDEX (BMI) DOCUMENTED: ICD-10-PCS | Mod: CPTII,S$GLB,, | Performed by: DERMATOLOGY

## 2023-11-28 PROCEDURE — 3044F HG A1C LEVEL LT 7.0%: CPT | Mod: CPTII,S$GLB,, | Performed by: DERMATOLOGY

## 2023-11-28 PROCEDURE — 1159F MED LIST DOCD IN RCRD: CPT | Mod: CPTII,S$GLB,, | Performed by: DERMATOLOGY

## 2023-11-28 NOTE — PROGRESS NOTES
Subjective:      Patient ID:  Gallito Wayne is a 43 y.o. male who presents for   Chief Complaint   Patient presents with    Skin Check     UBSE    Lesion     scalp     Would like skin check, two new spots on right anterior scalp do not bleed.  Had a lesion removed from mid back elsewhere sts was benign.     Lesion - Initial  Affected locations: scalp, left arm, right arm, chest, torso, back and abdomen  Signs / symptoms: asymptomatic    Review of Systems   Constitutional:  Negative for fever, chills, weight loss, weight gain, fatigue, night sweats and malaise.   Skin:  Positive for wears hat. Negative for daily sunscreen use and activity-related sunscreen use.   Hematologic/Lymphatic: Does not bruise/bleed easily.       Objective:   Physical Exam   Constitutional: He appears well-developed and well-nourished. No distress.   Neurological: He is alert and oriented to person, place, and time. He is not disoriented.   Psychiatric: He has a normal mood and affect.   Skin:   Areas Examined (abnormalities noted in diagram):   Head / Face Inspection Performed  Neck Inspection Performed  Chest / Axilla Inspection Performed  Back Inspection Performed  RUE Inspected  LUE Inspection Performed                 Diagram Legend     Erythematous scaling macule/papule c/w actinic keratosis       Vascular papule c/w angioma      Pigmented verrucoid papule/plaque c/w seborrheic keratosis      Yellow umbilicated papule c/w sebaceous hyperplasia      Irregularly shaped tan macule c/w lentigo     1-2 mm smooth white papules consistent with Milia      Movable subcutaneous cyst with punctum c/w epidermal inclusion cyst      Subcutaneous movable cyst c/w pilar cyst      Firm pink to brown papule c/w dermatofibroma      Pedunculated fleshy papule(s) c/w skin tag(s)      Evenly pigmented macule c/w junctional nevus     Mildly variegated pigmented, slightly irregular-bordered macule c/w mildly atypical nevus      Flesh colored to  "evenly pigmented papule c/w intradermal nevus       Pink pearly papule/plaque c/w basal cell carcinoma      Erythematous hyperkeratotic cursted plaque c/w SCC      Surgical scar with no sign of skin cancer recurrence      Open and closed comedones      Inflammatory papules and pustules      Verrucoid papule consistent consistent with wart     Erythematous eczematous patches and plaques     Dystrophic onycholytic nail with subungual debris c/w onychomycosis     Umbilicated papule    Erythematous-base heme-crusted tan verrucoid plaque consistent with inflamed seborrheic keratosis     Erythematous Silvery Scaling Plaque c/w Psoriasis     See annotation      Assessment / Plan:        Seborrheic keratoses  Seborrheic keratosis scattered, told benign no treatment needed.  Brochure provided.      Lentigines  The "ABCD" rules to observe pigmented lesions were reviewed.      Nevus of multiple sites  The "ABCD" rules to observe pigmented lesions were reviewed.  Brochure provided      Skin exam, screening for cancer   No lesions suspicious for malignancy noted.    Recommend daily sun protection/avoidance and use of at least SPF 30, broad spectrum sunscreen (OTC drug).                Follow up in about 1 year (around 11/28/2024).  "

## 2024-03-18 ENCOUNTER — OFFICE VISIT (OUTPATIENT)
Dept: FAMILY MEDICINE | Facility: CLINIC | Age: 44
End: 2024-03-18
Payer: COMMERCIAL

## 2024-03-18 VITALS
SYSTOLIC BLOOD PRESSURE: 136 MMHG | WEIGHT: 263.13 LBS | BODY MASS INDEX: 38.97 KG/M2 | DIASTOLIC BLOOD PRESSURE: 90 MMHG | TEMPERATURE: 98 F | HEIGHT: 69 IN | HEART RATE: 57 BPM | OXYGEN SATURATION: 98 %

## 2024-03-18 DIAGNOSIS — J30.89 ENVIRONMENTAL AND SEASONAL ALLERGIES: ICD-10-CM

## 2024-03-18 DIAGNOSIS — Z00.00 ANNUAL PHYSICAL EXAM: Primary | ICD-10-CM

## 2024-03-18 DIAGNOSIS — Z13.220 SCREENING CHOLESTEROL LEVEL: ICD-10-CM

## 2024-03-18 DIAGNOSIS — Z13.1 DIABETES MELLITUS SCREENING: ICD-10-CM

## 2024-03-18 DIAGNOSIS — Z13.0 SCREENING FOR DEFICIENCY ANEMIA: ICD-10-CM

## 2024-03-18 DIAGNOSIS — K21.9 CHRONIC GERD: ICD-10-CM

## 2024-03-18 DIAGNOSIS — L40.9 PSORIASIS: ICD-10-CM

## 2024-03-18 DIAGNOSIS — R73.03 PREDIABETES: ICD-10-CM

## 2024-03-18 DIAGNOSIS — E66.01 CLASS 2 SEVERE OBESITY WITH SERIOUS COMORBIDITY AND BODY MASS INDEX (BMI) OF 38.0 TO 38.9 IN ADULT, UNSPECIFIED OBESITY TYPE: ICD-10-CM

## 2024-03-18 DIAGNOSIS — Z98.890: ICD-10-CM

## 2024-03-18 DIAGNOSIS — Z13.29 THYROID DISORDER SCREEN: ICD-10-CM

## 2024-03-18 PROBLEM — E66.812 CLASS 2 SEVERE OBESITY WITH SERIOUS COMORBIDITY AND BODY MASS INDEX (BMI) OF 38.0 TO 38.9 IN ADULT, UNSPECIFIED OBESITY TYPE: Status: ACTIVE | Noted: 2024-03-18

## 2024-03-18 PROCEDURE — 3080F DIAST BP >= 90 MM HG: CPT | Mod: CPTII,S$GLB,, | Performed by: NURSE PRACTITIONER

## 2024-03-18 PROCEDURE — 1160F RVW MEDS BY RX/DR IN RCRD: CPT | Mod: CPTII,S$GLB,, | Performed by: NURSE PRACTITIONER

## 2024-03-18 PROCEDURE — 3008F BODY MASS INDEX DOCD: CPT | Mod: CPTII,S$GLB,, | Performed by: NURSE PRACTITIONER

## 2024-03-18 PROCEDURE — 1159F MED LIST DOCD IN RCRD: CPT | Mod: CPTII,S$GLB,, | Performed by: NURSE PRACTITIONER

## 2024-03-18 PROCEDURE — 99999 PR PBB SHADOW E&M-EST. PATIENT-LVL IV: CPT | Mod: PBBFAC,,, | Performed by: NURSE PRACTITIONER

## 2024-03-18 PROCEDURE — 3075F SYST BP GE 130 - 139MM HG: CPT | Mod: CPTII,S$GLB,, | Performed by: NURSE PRACTITIONER

## 2024-03-18 PROCEDURE — 99396 PREV VISIT EST AGE 40-64: CPT | Mod: S$GLB,,, | Performed by: NURSE PRACTITIONER

## 2024-03-18 NOTE — PROGRESS NOTES
Subjective:       Patient ID: Gallito Wayne is a 44 y.o. male.    Chief Complaint: Annual Exam and Establish Care    hospitals      ####NEW PATIENT TO ME - previous patient of Dr. Little###    Patient is a 44-year-old white male with psoriasis, prediabetes, chronic GERD, and obesity with a history of a normal left heart catheterization in 2023 head is here today to establish care with me, and get annual physical exam.  He will go for fasting labs this week    Prediabetes   Hemoglobin A1c 5.7% in March 2023.  He reports he has changed his diet and lost some weight in the past 2 months   Will get fasting labs this week to further evaluate.        Obesity   Body mass index is 38.86 kg/m².  Working on lifestyle modifications     Chronic GERD   Reports chronic reflux for years.    States he has been taking over-the-counter Zantac or Protonix depending on what over-the-counter when he is here versus working in [place].  Had atypical chest wall pain and had a left heart catheterization in 2023 [facility] that was normal and was advised to follow up with GI for an EGD.  Patient reports he changed to mostly a plant based diet in the past 2 months and was able to get off of medication  Advised patient that if symptoms recur that requires him to treat with medication that he should have an EGD to rule out any Triplett's esophagus, hiatal hernia, H pylori, etc.  Patient verbalizes understanding and will message me if symptoms recur and I will place referral to GI    History of heart catheterization   Patient works in [place] 28 days on/28 days off  Had atypical chest wall pain in early 2023.  Chest pains would not resolve so he had a heart catheterization done in early 2023 and states that there was no blockages.    Psoriasis  Positive psoriasis plaques to elbows and knees   Uses topical steroids p.r.n.    Environmental and seasonal allergies  Rotates Zyrtec or Allegra daily   Uses Flonase as needed  "p.r.n.  Stable    Health maintenance:   We will get fasting labs this week   Up-to-date on tetanus shot   Declines COVID booster             Review of Systems   HENT: Negative.     Respiratory: Negative.     Cardiovascular: Negative.    Gastrointestinal: Negative.    Skin:         psoriasis         Objective:     Vitals:    03/18/24 1259 03/18/24 1320   BP: (!) 120/90 (!) 136/90   BP Location: Left arm    Patient Position: Sitting    BP Method: Large (Manual)    Pulse: (!) 57    Temp: 98.1 °F (36.7 °C)    TempSrc: Temporal    SpO2: 98%    Weight: 119.3 kg (263 lb 1.9 oz)    Height: 5' 9" (1.753 m)           Physical Exam  Constitutional:       General: He is not in acute distress.     Appearance: Normal appearance. He is obese. He is not toxic-appearing or diaphoretic.      Comments: Body mass index is 38.86 kg/m².     HENT:      Right Ear: Tympanic membrane, ear canal and external ear normal.      Left Ear: Tympanic membrane, ear canal and external ear normal.      Nose: No congestion or rhinorrhea.      Mouth/Throat:      Pharynx: No oropharyngeal exudate or posterior oropharyngeal erythema.   Eyes:      Extraocular Movements: Extraocular movements intact.      Conjunctiva/sclera: Conjunctivae normal.   Cardiovascular:      Rate and Rhythm: Normal rate and regular rhythm.      Heart sounds: Normal heart sounds.   Pulmonary:      Effort: Pulmonary effort is normal. No respiratory distress.      Breath sounds: Normal breath sounds.   Abdominal:      General: There is no distension.   Skin:     General: Skin is warm and dry.      Comments: + plaque psoriasis to knees - see pictures below.   Neurological:      Mental Status: He is alert and oriented to person, place, and time.   Psychiatric:         Mood and Affect: Mood normal.         Behavior: Behavior normal.           Assessment:         ICD-10-CM ICD-9-CM   1. Annual physical exam  Z00.00 V70.0   2. Class 2 severe obesity with serious comorbidity and body mass " index (BMI) of 38.0 to 38.9 in adult, unspecified obesity type  E66.01 278.01    Z68.38 V85.38   3. Psoriasis  L40.9 696.1   4. Chronic GERD  K21.9 530.81   5. Environmental and seasonal allergies  J30.89 477.8   6. Prediabetes  R73.03 790.29   7. History of percutaneous left heart catheterization  Z98.890 V15.1   8. Screening for deficiency anemia  Z13.0 V78.1   9. Thyroid disorder screen  Z13.29 V77.0   10. Screening cholesterol level  Z13.220 V77.91   11. Diabetes mellitus screening  Z13.1 V77.1       Plan:       1. Annual physical exam  Fasting labs this week - will send results over patient portal.  -     CBC Auto Differential; Future; Expected date: 03/18/2024  -     Comprehensive Metabolic Panel; Future; Expected date: 03/18/2024  -     Hemoglobin A1C; Future; Expected date: 03/18/2024  -     Lipid Panel; Future; Expected date: 03/18/2024  -     TSH; Future; Expected date: 03/18/2024    2. Class 2 severe obesity with serious comorbidity and body mass index (BMI) of 38.0 to 38.9 in adult, unspecified obesity type  Overview:  Body mass index is 38.86 kg/m².  Working on lifestyle modifications         3. Psoriasis  Overview:  Positive psoriasis plaques to elbows and knees   Uses topical steroids p.r.n.        4. Chronic GERD  Overview:  Reports chronic reflux for years.    States he has been taking over-the-counter Zantac or Protonix depending on what over-the-counter when he is here versus working in [place].  Had atypical chest wall pain and had a left heart catheterization in 2023 [place] that was normal and was advised to follow up with GI for an EGD.  Patient reports he changed to mostly a plant based diet in the past 2 months and was able to get off of medication  Advised patient that if symptoms recur that requires him to treat with medication that he should have an EGD to rule out any Triplett's esophagus, hiatal hernia, H pylori, etc.  Patient verbalizes understanding and will message me if  symptoms recur and I will place referral to GI        5. Environmental and seasonal allergies  Overview:  Rotates Zyrtec or Allegra daily   Uses Flonase as needed p.r.n.  Stable      6. Prediabetes  Overview:  Hemoglobin A1c 5.7% in March 2023.  He reports he has changed his diet and lost some weight in the past 2 months   Will get fasting labs this week to further evaluate.        7. History of percutaneous left heart catheterization  Overview:  had heart catherization 2023 in [place] for chest pain - showed no obstructions.      8. Screening for deficiency anemia  -     CBC Auto Differential; Future; Expected date: 03/18/2024    9. Thyroid disorder screen  -     TSH; Future; Expected date: 03/18/2024    10. Screening cholesterol level  -     Lipid Panel; Future; Expected date: 03/18/2024    11. Diabetes mellitus screening  -     Comprehensive Metabolic Panel; Future; Expected date: 03/18/2024  -     Hemoglobin A1C; Future; Expected date: 03/18/2024       Follow up for pending lab results..     Patient's Medications   New Prescriptions    No medications on file   Previous Medications    CETIRIZINE (ZYRTEC) 10 MG TABLET    Take 10 mg by mouth once daily.    FEXOFENADINE (ALLEGRA) 180 MG TABLET    Take 180 mg by mouth once daily.    TRIAMCINOLONE ACETONIDE 0.1% (KENALOG) 0.1 % CREAM    Apply to affected area twice daily   Modified Medications    No medications on file   Discontinued Medications    PREDNISONE (DELTASONE) 20 MG TABLET    Take 1 tablet by mouth twice daily       Past Medical History:   Diagnosis Date    Chest pain 01/04/2016    Class 2 obesity due to excess calories without serious comorbidity with body mass index (BMI) of 37.0 to 37.9 in adult 01/22/2015    Headache     History of percutaneous left heart catheterization 2023    had heart catherization [place] [facility] [place] [place] - showed no obstructions.    Psoriasis 08/18/2017       Past Surgical History:   Procedure Laterality Date     BREAST SURGERY      Hormonal growth    CARDIAC CATHETERIZATION  2023    [facility] - no blockages noted.    REFRACTIVE SURGERY  2009       Family History   Problem Relation Age of Onset    Hypertension Mother     Diabetes Mother         type 2 diabete    Heart disease Mother         A-Fib , cryoablasion    Hyperlipidemia Mother     Early death Father 44        Diabetic complications    Diabetes Father         type 1 diabetes    Diabetes Sister     Cancer Paternal Grandfather         Lymphoma; prostate cancer at age 90    Prostate cancer Neg Hx     Kidney disease Neg Hx        Social History     Socioeconomic History    Marital status:    Tobacco Use    Smoking status: Never    Smokeless tobacco: Never   Substance and Sexual Activity    Alcohol use: Yes     Alcohol/week: 4.0 standard drinks of alcohol     Types: 4 Cans of beer per week     Comment: socially    Drug use: No    Sexual activity: Yes     Partners: Female     Birth control/protection: None     Social Determinants of Health     Financial Resource Strain: Low Risk  (3/17/2024)    Overall Financial Resource Strain (CARDIA)     Difficulty of Paying Living Expenses: Not hard at all   Food Insecurity: No Food Insecurity (3/17/2024)    Hunger Vital Sign     Worried About Running Out of Food in the Last Year: Never true     Ran Out of Food in the Last Year: Never true   Transportation Needs: No Transportation Needs (3/17/2024)    PRAPARE - Transportation     Lack of Transportation (Medical): No     Lack of Transportation (Non-Medical): No   Physical Activity: Insufficiently Active (3/17/2024)    Exercise Vital Sign     Days of Exercise per Week: 3 days     Minutes of Exercise per Session: 30 min   Stress: No Stress Concern Present (3/17/2024)    [employer] - Occupational Stress Questionnaire     Feeling of Stress : Not at all   Social Connections: Unknown (3/17/2024)    Social Connection and Isolation Panel [NHANES]      Frequency of Communication with Friends and Family: Three times a week     Frequency of Social Gatherings with Friends and Family: Twice a week     Active Member of Clubs or Organizations: No     Attends Club or Organization Meetings: Never     Marital Status:    Housing Stability: Unknown (3/17/2024)    Housing Stability Vital Sign     Unable to Pay for Housing in the Last Year: No     Unstable Housing in the Last Year: No

## 2024-11-01 ENCOUNTER — TELEPHONE (OUTPATIENT)
Dept: SPORTS MEDICINE | Facility: CLINIC | Age: 44
End: 2024-11-01
Payer: COMMERCIAL

## 2024-11-01 ENCOUNTER — PATIENT MESSAGE (OUTPATIENT)
Dept: FAMILY MEDICINE | Facility: CLINIC | Age: 44
End: 2024-11-01
Payer: COMMERCIAL

## 2024-11-01 DIAGNOSIS — M25.511 BILATERAL SHOULDER PAIN, UNSPECIFIED CHRONICITY: Primary | ICD-10-CM

## 2024-11-01 DIAGNOSIS — M25.512 BILATERAL SHOULDER PAIN, UNSPECIFIED CHRONICITY: Primary | ICD-10-CM

## 2024-11-04 ENCOUNTER — OFFICE VISIT (OUTPATIENT)
Dept: SPORTS MEDICINE | Facility: CLINIC | Age: 44
End: 2024-11-04
Payer: COMMERCIAL

## 2024-11-04 VITALS — BODY MASS INDEX: 40.36 KG/M2 | WEIGHT: 272.5 LBS | HEIGHT: 69 IN

## 2024-11-04 DIAGNOSIS — M25.819 SHOULDER IMPINGEMENT: ICD-10-CM

## 2024-11-04 DIAGNOSIS — M75.22 TENDINITIS OF LONG HEAD OF BICEPS BRACHII OF LEFT SHOULDER: Primary | ICD-10-CM

## 2024-11-04 PROCEDURE — 99204 OFFICE O/P NEW MOD 45 MIN: CPT | Mod: S$GLB,,, | Performed by: STUDENT IN AN ORGANIZED HEALTH CARE EDUCATION/TRAINING PROGRAM

## 2024-11-04 PROCEDURE — 1160F RVW MEDS BY RX/DR IN RCRD: CPT | Mod: CPTII,S$GLB,, | Performed by: STUDENT IN AN ORGANIZED HEALTH CARE EDUCATION/TRAINING PROGRAM

## 2024-11-04 PROCEDURE — 3008F BODY MASS INDEX DOCD: CPT | Mod: CPTII,S$GLB,, | Performed by: STUDENT IN AN ORGANIZED HEALTH CARE EDUCATION/TRAINING PROGRAM

## 2024-11-04 PROCEDURE — 3044F HG A1C LEVEL LT 7.0%: CPT | Mod: CPTII,S$GLB,, | Performed by: STUDENT IN AN ORGANIZED HEALTH CARE EDUCATION/TRAINING PROGRAM

## 2024-11-04 PROCEDURE — 99999 PR PBB SHADOW E&M-EST. PATIENT-LVL III: CPT | Mod: PBBFAC,,, | Performed by: STUDENT IN AN ORGANIZED HEALTH CARE EDUCATION/TRAINING PROGRAM

## 2024-11-04 PROCEDURE — 1159F MED LIST DOCD IN RCRD: CPT | Mod: CPTII,S$GLB,, | Performed by: STUDENT IN AN ORGANIZED HEALTH CARE EDUCATION/TRAINING PROGRAM

## 2024-11-04 NOTE — PROGRESS NOTES
Subjective:          Chief Complaint: Gallito Wayne is a 44 y.o. male who had concerns including Pain of the Left Shoulder and Pain of the Right Shoulder.    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 He notes that his pain started about 6 months ago with no injury or trauma. He complains of pain over the lateral aspect of the shoulders.  And some anterior pain as well.  He reports that his left shoulder is worse then the right. He denies any instability. He has full motion. He has not completed any formal physical therapy or received any corticosteroid injections.  Pain is worse with overhead motion.  Denies any numbness or paresthesias.      Dominant Hand: Left    Occupation: Desk worker    SSV:  R 80%   L 50%    Night Pain? Yes    Interfere with ADLs? Yes      Past Medical History:   Diagnosis Date    Chest pain 01/04/2016    Class 2 obesity due to excess calories without serious comorbidity with body mass index (BMI) of 37.0 to 37.9 in adult 01/22/2015    Headache     History of percutaneous left heart catheterization 2023    had heart catherization 2023 in [facility] for chest pain - showed no obstructions.    Psoriasis 08/18/2017       Current Outpatient Medications on File Prior to Visit   Medication Sig Dispense Refill    cetirizine (ZYRTEC) 10 MG tablet Take 10 mg by mouth once daily. (Patient not taking: Reported on 11/4/2024)      fexofenadine (ALLEGRA) 180 MG tablet Take 180 mg by mouth once daily. (Patient not taking: Reported on 11/4/2024)      triamcinolone acetonide 0.1% (KENALOG) 0.1 % cream Apply to affected area twice daily (Patient not taking: Reported on 11/4/2024) 30 g 0     No current facility-administered medications on file prior to visit.       Past Surgical History:   Procedure Laterality Date    BREAST SURGERY      Hormonal growth    CARDIAC CATHETERIZATION  2023     [place] - no blockages noted.    REFRACTIVE SURGERY  2009       Family History   Problem Relation Name Age of Onset    Hypertension Mother Eloise Wayne     Diabetes Mother Eloise Wayne         type 2 diabete    Heart disease Mother Eloise Wayne         A-Fib , cryoablasion    Hyperlipidemia Mother Eloise Wayne     Early death Father Gennaro Wayne 44        Diabetic complications    Diabetes Father Gennaro Wayne         type 1 diabetes    Diabetes Sister 1     Cancer Paternal Grandfather Quincy Wayne         Lymphoma; prostate cancer at age 90    Prostate cancer Neg Hx      Kidney disease Neg Hx         Social History     Socioeconomic History    Marital status:    Tobacco Use    Smoking status: Never    Smokeless tobacco: Never   Substance and Sexual Activity    Alcohol use: Yes     Alcohol/week: 4.0 standard drinks of alcohol     Types: 4 Cans of beer per week     Comment: socially    Drug use: No    Sexual activity: Yes     Partners: Female     Birth control/protection: None     Social Drivers of Health     Financial Resource Strain: Low Risk  (3/17/2024)    Overall Financial Resource Strain (CARDIA)     Difficulty of Paying Living Expenses: Not hard at all   Food Insecurity: No Food Insecurity (3/17/2024)    Hunger Vital Sign     Worried About Running Out of Food in the Last Year: Never true     Ran Out of Food in the Last Year: Never true   Transportation Needs: No Transportation Needs (3/17/2024)    PRAPARE - Transportation     Lack of Transportation (Medical): No     Lack of Transportation (Non-Medical): No   Physical Activity: Insufficiently Active (3/17/2024)    Exercise Vital Sign     Days of Exercise per Week: 3 days     Minutes of Exercise per Session: 30 min   Stress: No Stress Concern Present (3/17/2024)    [employer] - Occupational Stress Questionnaire     Feeling of Stress : Not at all   Housing  Stability: Unknown (3/17/2024)    Housing Stability Vital Sign     Unable to Pay for Housing in the Last Year: No     Unstable Housing in the Last Year: No       Review of Systems   Constitutional: Negative.   HENT: Negative.     Eyes: Negative.    Cardiovascular: Negative.    Respiratory: Negative.     Endocrine: Negative.    Hematologic/Lymphatic: Negative.    Skin: Negative.    Musculoskeletal:  Positive for joint pain (bilateral shoulder), muscle weakness and myalgias. Negative for arthritis and falls.   Neurological: Negative.    Psychiatric/Behavioral: Negative.     Allergic/Immunologic: Negative.                    Objective:        General: Gallito is well-developed, well-nourished, appears stated age, in no acute distress, alert and oriented to time, place and person.     General    Nursing note and vitals reviewed.  Constitutional: He is oriented to person, place, and time. He appears well-developed and well-nourished. No distress.   HENT:   Head: Normocephalic and atraumatic.   Nose: Nose normal.   Eyes: EOM are normal.   Cardiovascular:  Intact distal pulses.            Pulmonary/Chest: Effort normal. No respiratory distress.   Neurological: He is alert and oriented to person, place, and time.   Psychiatric: He has a normal mood and affect. His behavior is normal. Judgment and thought content normal.         Right Shoulder Exam     Inspection/Observation   Swelling: absent  Bruising: absent  Scars: absent  Deformity: absent  Scapular Winging: absent  Scapular Dyskinesia: negative  Atrophy: absent    Tenderness   The patient is tender to palpation of the biceps tendon.    Range of Motion   Active abduction:  170   Passive abduction:  170   Forward Flexion:  180   Forward Elevation: 180  External Rotation 0 degrees:  60   Internal rotation 0 degrees:  Mid thoracic     Tests & Signs   Cross arm: negative  Madrigal test: negative  Impingement: negative  Lift Off Sign: negative  Belly Press: negative  Active  Compression Test (Hingham's Sign): positive  Speed's Test: positive  Bear Hug: negative    Other   Sensation: normal    Left Shoulder Exam     Inspection/Observation   Swelling: absent  Bruising: absent  Scars: absent  Deformity: absent  Scapular Winging: absent  Scapular Dyskinesia: negative  Atrophy: absent    Tenderness   The patient is tender to palpation of the biceps tendon.    Range of Motion   Active abduction:  170   Passive abduction:  170   Forward Flexion:  180   Forward Elevation: 180  External Rotation 0 degrees:  60   Internal rotation 0 degrees:  Mid thoracic     Tests & Signs   Cross arm: negative  Madrigal test: negative  Impingement: negative  Lift Off Sign: negative  Belly Press: negative  Active Compression Test (Hingham's Sign): positive  Speed's Test: positive  Bear Hug: negative    Other   Sensation: normal       Muscle Strength   Right Upper Extremity   Shoulder Abduction: 5/5   Shoulder Internal Rotation: 5/5   Shoulder External Rotation: 5/5   Supraspinatus: 5/5   Subscapularis: 5/5   Biceps: 5/5   Left Upper Extremity  Shoulder Abduction: 5/5   Shoulder Internal Rotation: 5/5   Shoulder External Rotation: 5/5   Supraspinatus: 5/5   Subscapularis: 5/5   Biceps: 5/5     Vascular Exam     Right Pulses      Radial:                    2+      Left Pulses      Radial:                    2+      Capillary Refill  Right Hand: normal capillary refill  Left Hand: normal capillary refill      X-rays of the bilateral shoulders from 11/04/2024 personally viewed by me on that day these include AP, Grashey, scapular Y, axillary.  Bilateral glenohumeral joint is reduced.  Moderate to severe arthritic changes of the bilateral AC joints, no arthritic changes of the bilateral glenohumeral joints.          Assessment:     Gallito Wayne is a 44 y.o. male with bilateral shoulder impingement and biceps tendinitis  Encounter Diagnoses   Name Primary?    Tendinitis of long head of biceps brachii of  left shoulder Yes    Shoulder impingement           Plan:       The diagnosis treatment options were discussed at length with the patient all his questions were answered I showed him the x-rays and reviewed the findings with him.  We will refer him to physical therapy for demonstration of a home exercise program for shoulder and periscapular stabilization and strength.  He was heading out of town for a proximally 4 weeks he will return to clinic after.  If he is still symptomatic, we will obtain an MRI of the shoulder.

## 2024-11-07 ENCOUNTER — OFFICE VISIT (OUTPATIENT)
Dept: FAMILY MEDICINE | Facility: CLINIC | Age: 44
End: 2024-11-07
Payer: COMMERCIAL

## 2024-11-07 VITALS
DIASTOLIC BLOOD PRESSURE: 94 MMHG | OXYGEN SATURATION: 98 % | BODY MASS INDEX: 41.5 KG/M2 | HEART RATE: 72 BPM | HEIGHT: 68 IN | SYSTOLIC BLOOD PRESSURE: 146 MMHG | WEIGHT: 273.81 LBS | TEMPERATURE: 98 F

## 2024-11-07 DIAGNOSIS — E78.1 HIGH TRIGLYCERIDES: ICD-10-CM

## 2024-11-07 DIAGNOSIS — E66.01 CLASS 3 SEVERE OBESITY DUE TO EXCESS CALORIES WITH SERIOUS COMORBIDITY AND BODY MASS INDEX (BMI) OF 40.0 TO 44.9 IN ADULT: ICD-10-CM

## 2024-11-07 DIAGNOSIS — L40.9 PSORIASIS: ICD-10-CM

## 2024-11-07 DIAGNOSIS — Z23 FLU VACCINE NEED: ICD-10-CM

## 2024-11-07 DIAGNOSIS — R73.03 PREDIABETES: ICD-10-CM

## 2024-11-07 DIAGNOSIS — I10 PRIMARY HYPERTENSION: Primary | ICD-10-CM

## 2024-11-07 DIAGNOSIS — E66.813 CLASS 3 SEVERE OBESITY DUE TO EXCESS CALORIES WITH SERIOUS COMORBIDITY AND BODY MASS INDEX (BMI) OF 40.0 TO 44.9 IN ADULT: ICD-10-CM

## 2024-11-07 DIAGNOSIS — K21.9 CHRONIC GERD: ICD-10-CM

## 2024-11-07 PROCEDURE — 99999 PR PBB SHADOW E&M-EST. PATIENT-LVL IV: CPT | Mod: PBBFAC,,, | Performed by: NURSE PRACTITIONER

## 2024-11-07 RX ORDER — VALSARTAN 80 MG/1
80 TABLET ORAL DAILY
Qty: 90 TABLET | Refills: 0 | Status: SHIPPED | OUTPATIENT
Start: 2024-11-07 | End: 2025-11-07

## 2024-11-07 RX ORDER — ASCORBIC ACID 500 MG
500 TABLET ORAL DAILY
COMMUNITY

## 2024-11-07 NOTE — PROGRESS NOTES
"Subjective:       Patient ID: Gallito Wayne is a 44 y.o. male.    Chief Complaint: Blood Pressure Check (Patient report his diastolic number has been running in the 90's)        HPI WITH ASSESSMENT AND PLAN OF CARE:        Patient is a 44-year-old white male with psoriasis, prediabetes, chronic GERD, and obesity with a history of a normal left heart catheterization in 2023 head is here today to discuss elevated BP.    High Blood Pressure  Noted his blood pressure has been elevated past couple weeks  Checking at home - average 150s/90s  BP elevated today  Patient did gain 10 pounds in past 6 months.  BP (!) 146/94 (BP Location: Left arm, Patient Position: Sitting)   Pulse 72   Temp 97.5 °F (36.4 °C) (Temporal)   Ht 5' 8" (1.727 m)   Wt 124.2 kg (273 lb 13 oz)   SpO2 98%   BMI 41.63 kg/m²   BP Readings from Last 3 Encounters:   11/07/24 (!) 146/94   03/18/24 (!) 136/90   03/10/23 130/82   Start Valsartan 80 mg daily  Recheck in 6 weeks.     Answers submitted by the patient for this visit:  High Blood Pressure Questionnaire (Submitted on 11/3/2024)  Chief Complaint: Hypertension  Chronicity: recurrent  Onset: in the past 7 days  Progression since onset: unchanged  Condition status: resistant  anxiety: No  blurred vision: No  malaise/fatigue: No  orthopnea: No  peripheral edema: No  PND: No  Agents associated with hypertension: no associated agents  CAD risks: family history, obesity  Compliance problems: diet  Past treatments: nothing  Improvement on treatment: no improvement        Prediabetes   Hemoglobin A1c 5.7% in March 2023.  He reports he has changed his diet and lost some weight in early 2024 but has since gained weight back  Gained 10 pounds in past 7 months.  Will repeat labs in 6 weeks to re-evaluate          High Triglycerides  Gave handout on triglyceride diet  Recheck in 6 weeks with next blood draw.      Obesity   Body mass index is 41.63 kg/m².  Working on lifestyle modifications    " "    Chronic GERD   Reports chronic reflux for years.    Had atypical chest wall pain and had a left heart catheterization in 2023 in [place] that was normal and was advised to follow up with GI for an EGD.  Patient reports he changed to mostly a plant based diet in early 2024 and was able to get off of medication  Advised patient that if symptoms recur that requires him to treat with medication that he should have an EGD to rule out any Triplett's esophagus, hiatal hernia, H pylori, etc.  Patient verbalizes understanding and will message me if symptoms recur and I will place referral to GI  Reports no recent GI/GERD issues in past 6 months.    History of heart catheterization   Patient works in [place] 28 days on/28 days off  Had atypical chest wall pain in early 2023.  Chest pains would not resolve so he had a heart catheterization done in early 2023 and states that there was no blockages.       Psoriasis  Positive psoriasis plaques to elbows and knees   Uses topical steroids p.r.n.       Environmental and seasonal allergies  Rotates Zyrtec or Allegra daily   Uses Flonase as needed p.r.n.  Stable        Vitals:    11/07/24 0722 11/07/24 0733 11/07/24 0734   BP: (!) 126/90 (!) 146/90 (!) 146/94   BP Location: Right arm Right arm Left arm   Patient Position: Sitting Sitting Sitting   Pulse: 72     Temp: 97.5 °F (36.4 °C)     TempSrc: Temporal     SpO2: 98%     Weight: 124.2 kg (273 lb 13 oz)     Height: 5' 8" (1.727 m)           Diagnoses this Encounter:         ICD-10-CM ICD-9-CM   1. Primary hypertension  I10 401.9   2. Prediabetes  R73.03 790.29   3. High triglycerides  E78.1 272.1   4. Class 3 severe obesity due to excess calories with serious comorbidity and body mass index (BMI) of 40.0 to 44.9 in adult  E66.813 278.01    E66.01 V85.41    Z68.41    5. Psoriasis  L40.9 696.1   6. Chronic GERD  K21.9 530.81   7. Flu vaccine need  Z23 V04.81       Orders Placed This Encounter    Comprehensive Metabolic Panel    " Lipid Panel    Hemoglobin A1C    influenza (Flulaval, Fluzone, Fluarix) 45 mcg/0.5 mL IM vaccine (> or = 6 mo) 0.5 mL    valsartan (DIOVAN) 80 MG tablet        Follow up in about 6 weeks (around 12/19/2024) for fasting labs and follow up.     Patient's Medications   New Prescriptions    VALSARTAN (DIOVAN) 80 MG TABLET    Take 1 tablet (80 mg total) by mouth once daily.   Previous Medications    ASCORBIC ACID, VITAMIN C, (VITAMIN C) 500 MG TABLET    Take 500 mg by mouth once daily.    CETIRIZINE (ZYRTEC) 10 MG TABLET    Take 10 mg by mouth once daily.    FEXOFENADINE (ALLEGRA) 180 MG TABLET    Take 180 mg by mouth once daily.    TRIAMCINOLONE ACETONIDE 0.1% (KENALOG) 0.1 % CREAM    Apply to affected area twice daily   Modified Medications    No medications on file   Discontinued Medications    No medications on file         Review of Systems   HENT: Negative.     Respiratory: Negative.     Cardiovascular:  Negative for chest pain and palpitations.   Musculoskeletal:  Negative for neck pain.   Neurological:  Negative for headaches.         Objective:        Physical Exam  Constitutional:       General: He is not in acute distress.     Appearance: Normal appearance. He is obese. He is not toxic-appearing or diaphoretic.      Comments: Body mass index is 41.63 kg/m².     Cardiovascular:      Rate and Rhythm: Normal rate and regular rhythm.      Heart sounds: Normal heart sounds.   Pulmonary:      Effort: No respiratory distress.      Breath sounds: Normal breath sounds.   Neurological:      Mental Status: He is alert and oriented to person, place, and time.   Psychiatric:         Mood and Affect: Mood normal.         Behavior: Behavior normal.             Past Medical History:   Diagnosis Date    Chest pain 01/04/2016    Class 2 obesity due to excess calories without serious comorbidity with body mass index (BMI) of 37.0 to 37.9 in adult 01/22/2015    Headache     History of percutaneous left heart catheterization 2023     had heart catherization 2023 in [facility] for chest pain - showed no obstructions.    Psoriasis 08/18/2017       Past Surgical History:   Procedure Laterality Date    BREAST SURGERY      Hormonal growth    CARDIAC CATHETERIZATION  2023    [facility] - no blockages noted.    REFRACTIVE SURGERY  2009       Family History   Problem Relation Name Age of Onset    Hypertension Mother Eloise Wayne     Diabetes Mother Eloise Wayne         type 2 diabete    Heart disease Mother Eloise Wayne         A-Fib , cryoablasion    Hyperlipidemia Mother Eloise Wayne     Early death Father Gennaro Wayne 44        Diabetic complications    Diabetes Father Gennaro Wayne         type 1 diabetes    Diabetes Sister 1     Cancer Paternal Grandfather Quincy Wayne         Lymphoma; prostate cancer at age 90    Prostate cancer Neg Hx      Kidney disease Neg Hx         Social History     Socioeconomic History    Marital status:    Tobacco Use    Smoking status: Never    Smokeless tobacco: Never   Substance and Sexual Activity    Alcohol use: Yes     Alcohol/week: 4.0 standard drinks of alcohol     Types: 4 Cans of beer per week     Comment: socially    Drug use: No    Sexual activity: Yes     Partners: Female     Birth control/protection: None     Social Drivers of Health     Financial Resource Strain: Low Risk  (3/17/2024)    Overall Financial Resource Strain (CARDIA)     Difficulty of Paying Living Expenses: Not hard at all   Food Insecurity: No Food Insecurity (3/17/2024)    Hunger Vital Sign     Worried About Running Out of Food in the Last Year: Never true     Ran Out of Food in the Last Year: Never true   Transportation Needs: No Transportation Needs (3/17/2024)    PRAPARE - Transportation     Lack of Transportation (Medical): No     Lack of Transportation (Non-Medical): No   Physical Activity: Insufficiently Active (3/17/2024)    Exercise Vital Sign     Days of Exercise per Week: 3 days      Minutes of Exercise per Session: 30 min   Stress: No Stress Concern Present (3/17/2024)    [employer] [place] [employer] - Occupational Stress Questionnaire     Feeling of Stress : Not at all   Housing Stability: Unknown (3/17/2024)    Housing Stability Vital Sign     Unable to Pay for Housing in the Last Year: No     Unstable Housing in the Last Year: No

## 2024-12-17 ENCOUNTER — OFFICE VISIT (OUTPATIENT)
Dept: FAMILY MEDICINE | Facility: CLINIC | Age: 44
End: 2024-12-17
Payer: COMMERCIAL

## 2024-12-17 VITALS
OXYGEN SATURATION: 98 % | BODY MASS INDEX: 40.19 KG/M2 | HEART RATE: 84 BPM | SYSTOLIC BLOOD PRESSURE: 116 MMHG | HEIGHT: 68 IN | DIASTOLIC BLOOD PRESSURE: 84 MMHG | WEIGHT: 265.19 LBS | TEMPERATURE: 98 F

## 2024-12-17 DIAGNOSIS — L40.9 PSORIASIS: ICD-10-CM

## 2024-12-17 DIAGNOSIS — K21.9 CHRONIC GERD: ICD-10-CM

## 2024-12-17 DIAGNOSIS — Z00.00 ENCOUNTER FOR BLOOD TEST FOR ROUTINE GENERAL PHYSICAL EXAMINATION: ICD-10-CM

## 2024-12-17 DIAGNOSIS — Z13.0 SCREENING FOR DEFICIENCY ANEMIA: ICD-10-CM

## 2024-12-17 DIAGNOSIS — Z13.29 THYROID DISORDER SCREEN: ICD-10-CM

## 2024-12-17 DIAGNOSIS — J30.89 ENVIRONMENTAL AND SEASONAL ALLERGIES: ICD-10-CM

## 2024-12-17 DIAGNOSIS — Z13.220 SCREENING CHOLESTEROL LEVEL: ICD-10-CM

## 2024-12-17 DIAGNOSIS — I10 PRIMARY HYPERTENSION: Primary | ICD-10-CM

## 2024-12-17 DIAGNOSIS — Z98.890: ICD-10-CM

## 2024-12-17 DIAGNOSIS — Z13.1 DIABETES MELLITUS SCREENING: ICD-10-CM

## 2024-12-17 DIAGNOSIS — R73.03 PREDIABETES: ICD-10-CM

## 2024-12-17 DIAGNOSIS — E66.813 CLASS 3 SEVERE OBESITY DUE TO EXCESS CALORIES WITH SERIOUS COMORBIDITY AND BODY MASS INDEX (BMI) OF 40.0 TO 44.9 IN ADULT: ICD-10-CM

## 2024-12-17 DIAGNOSIS — E66.01 CLASS 3 SEVERE OBESITY DUE TO EXCESS CALORIES WITH SERIOUS COMORBIDITY AND BODY MASS INDEX (BMI) OF 40.0 TO 44.9 IN ADULT: ICD-10-CM

## 2024-12-17 PROBLEM — E78.1 HIGH TRIGLYCERIDES: Status: RESOLVED | Noted: 2024-11-07 | Resolved: 2024-12-17

## 2024-12-17 PROCEDURE — 1160F RVW MEDS BY RX/DR IN RCRD: CPT | Mod: CPTII,S$GLB,, | Performed by: NURSE PRACTITIONER

## 2024-12-17 PROCEDURE — 1159F MED LIST DOCD IN RCRD: CPT | Mod: CPTII,S$GLB,, | Performed by: NURSE PRACTITIONER

## 2024-12-17 PROCEDURE — 99214 OFFICE O/P EST MOD 30 MIN: CPT | Mod: S$GLB,,, | Performed by: NURSE PRACTITIONER

## 2024-12-17 PROCEDURE — 4010F ACE/ARB THERAPY RXD/TAKEN: CPT | Mod: CPTII,S$GLB,, | Performed by: NURSE PRACTITIONER

## 2024-12-17 PROCEDURE — 99999 PR PBB SHADOW E&M-EST. PATIENT-LVL III: CPT | Mod: PBBFAC,,, | Performed by: NURSE PRACTITIONER

## 2024-12-17 PROCEDURE — 3079F DIAST BP 80-89 MM HG: CPT | Mod: CPTII,S$GLB,, | Performed by: NURSE PRACTITIONER

## 2024-12-17 PROCEDURE — 3044F HG A1C LEVEL LT 7.0%: CPT | Mod: CPTII,S$GLB,, | Performed by: NURSE PRACTITIONER

## 2024-12-17 PROCEDURE — 3074F SYST BP LT 130 MM HG: CPT | Mod: CPTII,S$GLB,, | Performed by: NURSE PRACTITIONER

## 2024-12-17 PROCEDURE — 3008F BODY MASS INDEX DOCD: CPT | Mod: CPTII,S$GLB,, | Performed by: NURSE PRACTITIONER

## 2024-12-17 RX ORDER — TRIAMCINOLONE ACETONIDE 1 MG/G
CREAM TOPICAL
Qty: 80 G | Refills: 0 | Status: SHIPPED | OUTPATIENT
Start: 2024-12-17

## 2024-12-17 RX ORDER — TIRZEPATIDE 2.5 MG/.5ML
2.5 INJECTION, SOLUTION SUBCUTANEOUS
Qty: 4 PEN | Refills: 0 | Status: SHIPPED | OUTPATIENT
Start: 2024-12-17

## 2024-12-17 NOTE — PROGRESS NOTES
Subjective:       Patient ID: Gallito Wayne is a 44 y.o. male.    Chief Complaint: Follow-up (6 weeks F/U)        HPI WITH ASSESSMENT AND PLAN OF CARE:      Patient is a 44-year-old white male with psoriasis, prediabetes, chronic GERD, and obesity with a history of a normal left heart catheterization in 2023 head is here today for 6 week follow up with fasting lab results.       Hypertension  Diagnosed 11/7/2024  Started Valsartan 80 mg daily  BP much improved  BP Readings from Last 3 Encounters:   12/17/24 116/84   11/07/24 (!) 146/94   03/18/24 (!) 136/90   Stay on same medication  Recheck in 3 to 6 months.           Prediabetes   Hemoglobin A1c 5.7% in March 2023.  He reports he has changed his diet and lost some weight in early 2024 but has since gained weight back  Gained 10 pounds in past 7 months.  HgbA1C 5.7 now  Will work on lifestyle modifications  Will recheck in 3 to 6 months.           Obesity   Body mass index is 40.33 kg/m².  Working on lifestyle modifications   Will send in prescrption for Zepbound to see if insurance covers - patient has CVD risks of obesity, Hypertension and prediabetic  Start Zepbound 2.5 mg injection weekly x 4 weeks, then increase Zepbound 5 mg injection weekly x 8 weeks  Follow up in office to re-evaluate.          Chronic GERD   Reports chronic reflux for years.    Had atypical chest wall pain and had a left heart catheterization in 2023 in [place] that was normal and was advised to follow up with GI for an EGD.  Patient reports he changed to mostly a plant based diet in early 2024 and was able to get off of medication  Advised patient that if symptoms recur that requires him to treat with medication that he should have an EGD to rule out any Triplett's esophagus, hiatal hernia, H pylori, etc.  Patient verbalizes understanding and will message me if symptoms recur and I will place referral to GI  Reports no recent GI/GERD issues in past 6 months.       History of  heart catheterization   Patient works in [place] 28 days on/28 days off  Had atypical chest wall pain in early 2023.  Chest pains would not resolve so he had a heart catheterization done in early 2023 and states that there was no blockages.        Psoriasis  Positive psoriasis plaques to elbows and knees   Uses topical steroids p.r.n.        Environmental and seasonal allergies  Rotates Zyrtec or Allegra daily   Uses Flonase as needed p.r.n.  Stable      Lab Visit on 12/13/2024   Component Date Value Ref Range Status    Sodium 12/13/2024 139  136 - 145 mmol/L Final    Potassium 12/13/2024 4.4  3.5 - 5.1 mmol/L Final    Chloride 12/13/2024 106  95 - 110 mmol/L Final    CO2 12/13/2024 25  23 - 29 mmol/L Final    Glucose 12/13/2024 96  70 - 110 mg/dL Final    BUN 12/13/2024 13  6 - 20 mg/dL Final    Creatinine 12/13/2024 1.2  0.5 - 1.4 mg/dL Final    Calcium 12/13/2024 9.2  8.7 - 10.5 mg/dL Final    Total Protein 12/13/2024 7.2  6.0 - 8.4 g/dL Final    Albumin 12/13/2024 4.0  3.5 - 5.2 g/dL Final    Total Bilirubin 12/13/2024 0.6  0.1 - 1.0 mg/dL Final    Comment: For infants and newborns, interpretation of results should be based  on gestational age, weight and in agreement with clinical  observations.    Premature Infant recommended reference ranges:  Up to 24 hours.............<8.0 mg/dL  Up to 48 hours............<12.0 mg/dL  3-5 days..................<15.0 mg/dL  6-29 days.................<15.0 mg/dL      Alkaline Phosphatase 12/13/2024 52  40 - 150 U/L Final    AST 12/13/2024 19  10 - 40 U/L Final    ALT 12/13/2024 20  10 - 44 U/L Final    eGFR 12/13/2024 >60.0  >60 mL/min/1.73 m^2 Final    Anion Gap 12/13/2024 8  8 - 16 mmol/L Final    Cholesterol 12/13/2024 178  120 - 199 mg/dL Final    Comment: The National Cholesterol Education Program (NCEP) has set the  following guidelines (reference ranges) for Cholesterol:  Optimal.....................<200 mg/dL  Borderline High.............200-239  mg/dL  High........................> or = 240 mg/dL      Triglycerides 12/13/2024 102  30 - 150 mg/dL Final    Comment: The National Cholesterol Education Program (NCEP) has set the  following guidelines (reference values) for triglycerides:  Normal......................<150 mg/dL  Borderline High.............150-199 mg/dL  High........................200-499 mg/dL      HDL 12/13/2024 44  40 - 75 mg/dL Final    Comment: The National Cholesterol Education Program (NCEP) has set the  following guidelines (reference values) for HDL Cholesterol:  Low...............<40 mg/dL  Optimal...........>60 mg/dL      LDL Cholesterol 12/13/2024 113.6  63.0 - 159.0 mg/dL Final    Comment: The National Cholesterol Education Program (NCEP) has set the  following guidelines (reference values) for LDL Cholesterol:  Optimal.......................<130 mg/dL  Borderline High...............130-159 mg/dL  High..........................160-189 mg/dL  Very High.....................>190 mg/dL      HDL/Cholesterol Ratio 12/13/2024 24.7  20.0 - 50.0 % Final    Total Cholesterol/HDL Ratio 12/13/2024 4.0  2.0 - 5.0 Final    Non-HDL Cholesterol 12/13/2024 134  mg/dL Final    Comment: Risk category and Non-HDL cholesterol goals:  Coronary heart disease (CHD)or equivalent (10-year risk of CHD >20%):  Non-HDL cholesterol goal     <130 mg/dL  Two or more CHD risk factors and 10-year risk of CHD <= 20%:  Non-HDL cholesterol goal     <160 mg/dL  0 to 1 CHD risk factor:  Non-HDL cholesterol goal     <190 mg/dL      Hemoglobin A1C 12/13/2024 5.7 (H)  4.0 - 5.6 % Final    Comment: ADA Screening Guidelines:  5.7-6.4%  Consistent with prediabetes  >or=6.5%  Consistent with diabetes    High levels of fetal hemoglobin interfere with the HbA1C  assay. Heterozygous hemoglobin variants (HbS, HgC, etc)do  not significantly interfere with this assay.   However, presence of multiple variants may affect accuracy.      Estimated Avg Glucose 12/13/2024 117  68 - 131  "mg/dL Final       Vitals:    12/17/24 0848   BP: 116/84   BP Location: Left arm   Patient Position: Sitting   Pulse: 84   Temp: 98.1 °F (36.7 °C)   TempSrc: Temporal   SpO2: 98%   Weight: 120.3 kg (265 lb 3.4 oz)   Height: 5' 8" (1.727 m)         Diagnoses this Encounter:         ICD-10-CM ICD-9-CM   1. Primary hypertension  I10 401.9   2. History of percutaneous left heart catheterization  Z98.890 V15.1   3. Class 3 severe obesity due to excess calories with serious comorbidity and body mass index (BMI) of 40.0 to 44.9 in adult  E66.813 278.01    E66.01 V85.41    Z68.41    4. Prediabetes  R73.03 790.29   5. Chronic GERD  K21.9 530.81   6. Environmental and seasonal allergies  J30.89 477.8   7. Psoriasis  L40.9 696.1   8. Encounter for blood test for routine general physical examination  Z00.00 V72.62   9. Screening for deficiency anemia  Z13.0 V78.1   10. Thyroid disorder screen  Z13.29 V77.0   11. Screening cholesterol level  Z13.220 V77.91   12. Diabetes mellitus screening  Z13.1 V77.1       Orders Placed This Encounter    CBC Auto Differential    Comprehensive Metabolic Panel    Hemoglobin A1C    Lipid Panel    TSH    tirzepatide, weight loss, (ZEPBOUND) 2.5 mg/0.5 mL PnIj    triamcinolone acetonide 0.1% (KENALOG) 0.1 % cream        Follow up in about 3 months (around 3/17/2025) for fasting labs and WELLNESS EXAM.     Patient's Medications   New Prescriptions    TIRZEPATIDE, WEIGHT LOSS, (ZEPBOUND) 2.5 MG/0.5 ML PNIJ    Inject 2.5 mg into the skin every 7 days.   Previous Medications    ASCORBIC ACID, VITAMIN C, (VITAMIN C) 500 MG TABLET    Take 500 mg by mouth once daily.    CETIRIZINE (ZYRTEC) 10 MG TABLET    Take 10 mg by mouth once daily.    FEXOFENADINE (ALLEGRA) 180 MG TABLET    Take 180 mg by mouth once daily.    VALSARTAN (DIOVAN) 80 MG TABLET    Take 1 tablet (80 mg total) by mouth once daily.   Modified Medications    Modified Medication Previous Medication    TRIAMCINOLONE ACETONIDE 0.1% (KENALOG) 0.1 " % CREAM triamcinolone acetonide 0.1% (KENALOG) 0.1 % cream       Apply to affected area twice daily    Apply to affected area twice daily   Discontinued Medications    No medications on file         Review of Systems   HENT: Negative.     Respiratory: Negative.     Cardiovascular: Negative.    Gastrointestinal: Negative.          Objective:        Physical Exam  Constitutional:       General: He is not in acute distress.     Appearance: Normal appearance. He is obese. He is not toxic-appearing or diaphoretic.      Comments: Body mass index is 40.33 kg/m².     Cardiovascular:      Rate and Rhythm: Normal rate and regular rhythm.      Heart sounds: Normal heart sounds.   Pulmonary:      Effort: No respiratory distress.      Breath sounds: Normal breath sounds.   Neurological:      Mental Status: He is alert and oriented to person, place, and time.   Psychiatric:         Mood and Affect: Mood normal.         Behavior: Behavior normal.             Past Medical History:   Diagnosis Date    Chest pain 01/04/2016    Class 2 obesity due to excess calories without serious comorbidity with body mass index (BMI) of 37.0 to 37.9 in adult 01/22/2015    Headache     History of percutaneous left heart catheterization 2023    had heart catherization 2023 in [facility] for chest pain - showed no obstructions.    Psoriasis 08/18/2017       Past Surgical History:   Procedure Laterality Date    BREAST SURGERY      Hormonal growth    CARDIAC CATHETERIZATION  2023    [facility] - no blockages noted.    REFRACTIVE SURGERY  2009       Family History   Problem Relation Name Age of Onset    Hypertension Mother Eloise Wayne     Diabetes Mother Eloise Wayne         type 2 diabete    Heart disease Mother Eloise Wayne         A-Fib , cryoablasion    Hyperlipidemia Mother Eloise Wayne     Early death Father Gennaro Wayne 44        Diabetic complications    Diabetes Father Gennaro Wayne         type 1 diabetes     Diabetes Sister 1     Cancer Paternal Grandfather Quincy Wayne         Lymphoma; prostate cancer at age 90    Prostate cancer Neg Hx      Kidney disease Neg Hx         Social History     Socioeconomic History    Marital status:    Tobacco Use    Smoking status: Never    Smokeless tobacco: Never   Substance and Sexual Activity    Alcohol use: Yes     Alcohol/week: 4.0 standard drinks of alcohol     Types: 4 Cans of beer per week     Comment: socially    Drug use: No    Sexual activity: Yes     Partners: Female     Birth control/protection: None     Social Drivers of Health     Financial Resource Strain: Low Risk  (3/17/2024)    Overall Financial Resource Strain (CARDIA)     Difficulty of Paying Living Expenses: Not hard at all   Food Insecurity: No Food Insecurity (3/17/2024)    Hunger Vital Sign     Worried About Running Out of Food in the Last Year: Never true     Ran Out of Food in the Last Year: Never true   Transportation Needs: No Transportation Needs (3/17/2024)    PRAPARE - Transportation     Lack of Transportation (Medical): No     Lack of Transportation (Non-Medical): No   Physical Activity: Insufficiently Active (3/17/2024)    Exercise Vital Sign     Days of Exercise per Week: 3 days     Minutes of Exercise per Session: 30 min   Stress: No Stress Concern Present (3/17/2024)    [employer] [place] [employer] - Occupational Stress Questionnaire     Feeling of Stress : Not at all   Housing Stability: Unknown (3/17/2024)    Housing Stability Vital Sign     Unable to Pay for Housing in the Last Year: No     Unstable Housing in the Last Year: No

## 2024-12-23 ENCOUNTER — OFFICE VISIT (OUTPATIENT)
Dept: DERMATOLOGY | Facility: CLINIC | Age: 44
End: 2024-12-23
Payer: COMMERCIAL

## 2024-12-23 ENCOUNTER — OFFICE VISIT (OUTPATIENT)
Dept: SPORTS MEDICINE | Facility: CLINIC | Age: 44
End: 2024-12-23
Payer: COMMERCIAL

## 2024-12-23 VITALS — BODY MASS INDEX: 39.77 KG/M2 | WEIGHT: 262.44 LBS | HEIGHT: 68 IN

## 2024-12-23 DIAGNOSIS — L81.4 LENTIGINES: ICD-10-CM

## 2024-12-23 DIAGNOSIS — L40.0 PSORIASIS VULGARIS: ICD-10-CM

## 2024-12-23 DIAGNOSIS — M75.22 TENDINITIS OF LONG HEAD OF BICEPS BRACHII OF LEFT SHOULDER: Primary | ICD-10-CM

## 2024-12-23 DIAGNOSIS — L40.8 INVERSE PSORIASIS: ICD-10-CM

## 2024-12-23 DIAGNOSIS — D22.9 NEVUS OF MULTIPLE SITES: ICD-10-CM

## 2024-12-23 DIAGNOSIS — L82.1 SEBORRHEIC KERATOSES: Primary | ICD-10-CM

## 2024-12-23 PROCEDURE — G2211 COMPLEX E/M VISIT ADD ON: HCPCS | Mod: S$GLB,,, | Performed by: DERMATOLOGY

## 2024-12-23 PROCEDURE — 1160F RVW MEDS BY RX/DR IN RCRD: CPT | Mod: CPTII,S$GLB,, | Performed by: STUDENT IN AN ORGANIZED HEALTH CARE EDUCATION/TRAINING PROGRAM

## 2024-12-23 PROCEDURE — 1160F RVW MEDS BY RX/DR IN RCRD: CPT | Mod: CPTII,S$GLB,, | Performed by: DERMATOLOGY

## 2024-12-23 PROCEDURE — 4010F ACE/ARB THERAPY RXD/TAKEN: CPT | Mod: CPTII,S$GLB,, | Performed by: DERMATOLOGY

## 2024-12-23 PROCEDURE — 1159F MED LIST DOCD IN RCRD: CPT | Mod: CPTII,S$GLB,, | Performed by: STUDENT IN AN ORGANIZED HEALTH CARE EDUCATION/TRAINING PROGRAM

## 2024-12-23 PROCEDURE — 3008F BODY MASS INDEX DOCD: CPT | Mod: CPTII,S$GLB,, | Performed by: STUDENT IN AN ORGANIZED HEALTH CARE EDUCATION/TRAINING PROGRAM

## 2024-12-23 PROCEDURE — 99999 PR PBB SHADOW E&M-EST. PATIENT-LVL II: CPT | Mod: PBBFAC,,, | Performed by: DERMATOLOGY

## 2024-12-23 PROCEDURE — 99999 PR PBB SHADOW E&M-EST. PATIENT-LVL III: CPT | Mod: PBBFAC,,, | Performed by: STUDENT IN AN ORGANIZED HEALTH CARE EDUCATION/TRAINING PROGRAM

## 2024-12-23 PROCEDURE — 1159F MED LIST DOCD IN RCRD: CPT | Mod: CPTII,S$GLB,, | Performed by: DERMATOLOGY

## 2024-12-23 PROCEDURE — 99214 OFFICE O/P EST MOD 30 MIN: CPT | Mod: S$GLB,,, | Performed by: DERMATOLOGY

## 2024-12-23 PROCEDURE — 4010F ACE/ARB THERAPY RXD/TAKEN: CPT | Mod: CPTII,S$GLB,, | Performed by: STUDENT IN AN ORGANIZED HEALTH CARE EDUCATION/TRAINING PROGRAM

## 2024-12-23 PROCEDURE — 3044F HG A1C LEVEL LT 7.0%: CPT | Mod: CPTII,S$GLB,, | Performed by: DERMATOLOGY

## 2024-12-23 PROCEDURE — 99213 OFFICE O/P EST LOW 20 MIN: CPT | Mod: S$GLB,,, | Performed by: STUDENT IN AN ORGANIZED HEALTH CARE EDUCATION/TRAINING PROGRAM

## 2024-12-23 PROCEDURE — 3044F HG A1C LEVEL LT 7.0%: CPT | Mod: CPTII,S$GLB,, | Performed by: STUDENT IN AN ORGANIZED HEALTH CARE EDUCATION/TRAINING PROGRAM

## 2024-12-23 RX ORDER — FLUCONAZOLE 200 MG/1
TABLET ORAL
Qty: 6 TABLET | Refills: 1 | Status: SHIPPED | OUTPATIENT
Start: 2024-12-23

## 2024-12-23 RX ORDER — ROFLUMILAST 3 MG/G
CREAM TOPICAL
Qty: 60 G | Refills: 3 | Status: SHIPPED | OUTPATIENT
Start: 2024-12-23

## 2024-12-23 NOTE — PROGRESS NOTES
Subjective:      Patient ID:  Gallito Wayne is a 44 y.o. male who presents for   Chief Complaint   Patient presents with    Psoriasis    Skin Check     ubse     Pt present today for psoriasis F/U and UBSE.   Pt has had psoriasis for over 35 years on elbows and knees. Used steroids and tac cream and still present. Has rash in groin as well. Itches. Used antifungals, steroids, baby cream and still present  Has intermittent hand pain.    Pt feels psoriasis is spreading to shins.          Psoriasis - Follow-up  Symptom course: worsening  Signs / symptoms: itching      Review of Systems   Constitutional:  Negative for fever, chills, weight loss, weight gain, fatigue, night sweats and malaise.   Musculoskeletal:  Positive for arthralgias.        Intermittent   Skin:  Positive for itching, rash and wears hat. Negative for daily sunscreen use, activity-related sunscreen use and recent sunburn.   Hematologic/Lymphatic: Does not bruise/bleed easily.       Objective:   Physical Exam   Constitutional: He appears well-developed and well-nourished. No distress.   Neurological: He is alert and oriented to person, place, and time. He is not disoriented.   Psychiatric: He has a normal mood and affect.   Skin:   Areas Examined (abnormalities noted in diagram):   Scalp / Hair Palpated and Inspected  Head / Face Inspection Performed  Neck Inspection Performed  Chest / Axilla Inspection Performed  Abdomen Inspection Performed  Genitals / Buttocks / Groin Inspection Performed  Back Inspection Performed  RUE Inspected  LUE Inspection Performed  RLE Inspected  LLE Inspection Performed  Nails and Digits Inspection Performed                 Diagram Legend     Erythematous scaling macule/papule c/w actinic keratosis       Vascular papule c/w angioma      Pigmented verrucoid papule/plaque c/w seborrheic keratosis      Yellow umbilicated papule c/w sebaceous hyperplasia      Irregularly shaped tan macule c/w lentigo     1-2 mm  smooth white papules consistent with Milia      Movable subcutaneous cyst with punctum c/w epidermal inclusion cyst      Subcutaneous movable cyst c/w pilar cyst      Firm pink to brown papule c/w dermatofibroma      Pedunculated fleshy papule(s) c/w skin tag(s)      Evenly pigmented macule c/w junctional nevus     Mildly variegated pigmented, slightly irregular-bordered macule c/w mildly atypical nevus      Flesh colored to evenly pigmented papule c/w intradermal nevus       Pink pearly papule/plaque c/w basal cell carcinoma      Erythematous hyperkeratotic cursted plaque c/w SCC      Surgical scar with no sign of skin cancer recurrence      Open and closed comedones      Inflammatory papules and pustules      Verrucoid papule consistent consistent with wart     Erythematous eczematous patches and plaques     Dystrophic onycholytic nail with subungual debris c/w onychomycosis     Umbilicated papule    Erythematous-base heme-crusted tan verrucoid plaque consistent with inflamed seborrheic keratosis     Erythematous Silvery Scaling Plaque c/w Psoriasis     See annotation      Assessment / Plan:        Seborrheic keratoses  These are benign inherited growths without a malignant potential. Reassurance given to patient. No treatment is necessary.     Lentigines  This is a benign hyperpigmented sun induced lesion. Recommend daily sun protection/avoidance and use of at least SPF 30, broad spectrum sunscreen (OTC drug) will reduce the number of new lesions. Treatment of these benign lesions are considered cosmetic.  The nature of sun-induced photo-aging and skin cancers is discussed.  Sun avoidance, protective clothing, and the use of 30-SPF sunscreens is advised. Observe closely for skin damage/changes, and call if such occurs.    Nevus of multiple sites  Discussed ABCDE's of nevi.  Monitor for new mole or moles that are becoming bigger, darker, irritated, or developing irregular borders. Brochure provided. Instructed  patient to observe lesion(s) for changes and follow up in clinic if changes are noted. Patient to monitor skin at home for new or changing lesions.     Psoriasis vulgaris  - Inverse psoriasis  -     roflumilast (ZORYVE) 0.3 % Crea; Apply to affected area daily for psoriasis; can use in groin  Dispense: 60 g; Refill: 3  -     fluconazole (DIFLUCAN) 200 MG Tab; Sig 1 pill po qd x 3 days  and repeat in 1 week  Dispense: 6 tablet; Refill: 1    Zoryve then criticaid clear to gluteal cleft   Pt to see what biologics covered by insurance - consider systemic therapy       Total body skin examination performed today including at least 12 points as noted in physical examination. No lesions suspicious for malignancy noted.    Recommend daily sun protection/avoidance, use of at least SPF 30, broad spectrum sunscreen (OTC drug), skin self examinations, and routine physician surveillance to optimize early detection           Follow up in about 6 weeks (around 2/3/2025) for Medication Management.

## 2024-12-23 NOTE — PROGRESS NOTES
Subjective:          Chief Complaint: Gallito Wayne is a 44 y.o. male who had concerns including Follow-up of the Left Shoulder and Follow-up of the Right Shoulder.    CC:  bilateral Shoulder Pain    HPI 12/23/2024    CHIEF COMPLAINT:- Client presents today for follow-up of left shoulder pain. Handedness is not discussed.    HPI:Client presents for a follow-up regarding shoulder pain, reporting significant improvement since the last visit. 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 His current regimen includes standing exercises with a resistance band between workout sets at the gym.The shoulder is now fully functional but still experiences some discomfort at extreme ranges of motion, particularly during rotational movements. The remaining pain is described as a background sensation, significantly less severe than before. He denies any restrictions in daily activities or excruciating pain due to the shoulder pain.Client is considering adding resistance to exercises, planning to incorporate resistance bands at the gym. Upper body exercises have been avoided to prevent setbacks in recovery.    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 He notes that his pain started about 6 months ago with no injury or trauma. He complains of pain over the lateral aspect of the shoulders.  And some anterior pain as well.  He reports that his left shoulder is worse then the right. He denies any instability. He  has full motion. He has not completed any formal physical therapy or received any corticosteroid injections.  Pain is worse with overhead motion.  Denies any numbness or paresthesias.      Dominant Hand: Left    Occupation: Desk worker    SSV:  R 80%   L 50%    Night Pain? Yes    Interfere with ADLs? Yes        Past Medical History:   Diagnosis Date    Chest pain 01/04/2016    Class 2 obesity due to excess calories without serious comorbidity with body mass index (BMI) of 37.0 to 37.9 in adult 01/22/2015    Headache     History of percutaneous left heart catheterization 2023    [place] [facility] showed no obstructions.    Psoriasis 08/18/2017       Current Outpatient Medications on File Prior to Visit   Medication Sig Dispense Refill    ascorbic acid, vitamin C, (VITAMIN C) 500 MG tablet Take 500 mg by mouth once daily.      cetirizine (ZYRTEC) 10 MG tablet Take 10 mg by mouth once daily.      fexofenadine (ALLEGRA) 180 MG tablet Take 180 mg by mouth once daily.      tirzepatide, weight loss, (ZEPBOUND) 2.5 mg/0.5 mL PnIj Inject 2.5 mg into the skin every 7 days. 4 Pen 0    triamcinolone acetonide 0.1% (KENALOG) 0.1 % cream Apply to affected area twice daily 80 g 0    valsartan (DIOVAN) 80 MG tablet Take 1 tablet (80 mg total) by mouth once daily. 90 tablet 0     No current facility-administered medications on file prior to visit.       Past Surgical History:   Procedure Laterality Date    BREAST SURGERY      Hormonal growth    CARDIAC [place]    [facility] [place].    REFRACTIVE SURGERY  2009       Family History   Problem Relation Name Age of Onset    Hypertension Mother Eloise Wayne     Diabetes Mother Eloise Wayne         type 2 diabete    Heart disease Mother Eloise Wayne         A-Fib , cryoablasion    Hyperlipidemia Mother Eloise Wayne     Early death Father Gennaro Wayne 44        Diabetic complications    Diabetes  Father Gennaro Wayne         type 1 diabetes    Diabetes Sister 1     Cancer Paternal Grandfather Quincy Wayne         Lymphoma; prostate cancer at age 90    Prostate cancer Neg Hx      Kidney disease Neg Hx         Social History     Socioeconomic History    Marital status:    Tobacco Use    Smoking status: Never    Smokeless tobacco: Never   Substance and Sexual Activity    Alcohol use: Yes     Alcohol/week: 4.0 standard drinks of alcohol     Types: 4 Cans of beer per week     Comment: socially    Drug use: No    Sexual activity: Yes     Partners: Female     Birth control/protection: None     Social Drivers of Health     Financial Resource Strain: Low Risk  (3/17/2024)    Overall Financial Resource Strain (CARDIA)     Difficulty of Paying Living Expenses: Not hard at all   Food Insecurity: No Food Insecurity (3/17/2024)    Hunger Vital Sign     Worried About Running Out of Food in the Last Year: Never true     Ran Out of Food in the Last Year: Never true   Transportation Needs: No Transportation Needs (3/17/2024)    PRAPARE - Transportation     Lack of Transportation (Medical): No     Lack of Transportation (Non-Medical): No   Physical Activity: Insufficiently Active (3/17/2024)    Exercise Vital Sign     Days of Exercise per Week: 3 days     Minutes of Exercise per Session: 30 min   Stress: No Stress Concern Present (3/17/2024)    [employer] [employer] - Occupational Stress Questionnaire     Feeling of Stress : Not at all   Housing Stability: Unknown (3/17/2024)    Housing Stability Vital Sign     Unable to Pay for Housing in the Last Year: No     Unstable Housing in the Last Year: No       Review of Systems   Constitutional: Negative.   HENT: Negative.     Eyes: Negative.    Cardiovascular: Negative.    Respiratory: Negative.     Endocrine: Negative.    Hematologic/Lymphatic: Negative.    Skin: Negative.    Musculoskeletal:  Negative for arthritis, falls, joint pain (bilateral  shoulder), muscle weakness and myalgias.   Neurological: Negative.    Psychiatric/Behavioral: Negative.     Allergic/Immunologic: Negative.                    Objective:        General: Gallito is well-developed, well-nourished, appears stated age, in no acute distress, alert and oriented to time, place and person.     General    Nursing note and vitals reviewed.  Constitutional: He is oriented to person, place, and time. He appears well-developed and well-nourished. No distress.   HENT:   Head: Normocephalic and atraumatic.   Nose: Nose normal.   Eyes: EOM are normal.   Cardiovascular:  Intact distal pulses.            Pulmonary/Chest: Effort normal. No respiratory distress.   Neurological: He is alert and oriented to person, place, and time.   Psychiatric: He has a normal mood and affect. His behavior is normal. Judgment and thought content normal.         Right Shoulder Exam     Inspection/Observation   Swelling: absent  Bruising: absent  Scars: absent  Deformity: absent  Scapular Winging: absent  Scapular Dyskinesia: negative  Atrophy: absent    Tenderness   The patient is experiencing no tenderness.    Range of Motion   Active abduction:  170   Passive abduction:  170   Forward Flexion:  180   Forward Elevation: 180  External Rotation 0 degrees:  60   Internal rotation 0 degrees:  Mid thoracic     Tests & Signs   Cross arm: negative  Madrigal test: negative  Impingement: negative  Lift Off Sign: negative  Belly Press: negative  Active Compression Test ([place]'s Sign): negative  Speed's Test: negative  Bear Hug: negative    Other   Sensation: normal    Left Shoulder Exam     Inspection/Observation   Swelling: absent  Bruising: absent  Scars: absent  Deformity: absent  Scapular Winging: absent  Scapular Dyskinesia: negative  Atrophy: absent    Tenderness   The patient is experiencing no tenderness.     Range of Motion   Active abduction:  170   Passive abduction:  170   Forward Flexion:  180   Forward Elevation:  180  External Rotation 0 degrees:  60   Internal rotation 0 degrees:  Mid thoracic     Tests & Signs   Cross arm: negative  Madrigal test: negative  Impingement: negative  Lift Off Sign: negative  Belly Press: negative  Active Compression test ([place]'s Sign): negative  Speed's Test: positive  Bear Hug: negative    Other   Sensation: normal       Muscle Strength   Right Upper Extremity   Shoulder Abduction: 5/5   Shoulder Internal Rotation: 5/5   Shoulder External Rotation: 5/5   Supraspinatus: 5/5   Subscapularis: 5/5   Biceps: 5/5   Left Upper Extremity  Shoulder Abduction: 5/5   Shoulder Internal Rotation: 5/5   Shoulder External Rotation: 5/5   Supraspinatus: 5/5   Subscapularis: 5/5   Biceps: 5/5     Vascular Exam     Right Pulses      Radial:                    2+      Left Pulses      Radial:                    2+      Capillary Refill  Right Hand: normal capillary refill  Left Hand: normal capillary refill        X-rays of the bilateral shoulders from 11/04/2024 personally viewed by me on that day these include AP, Grashey, scapular Y, axillary.  Bilateral glenohumeral joint is reduced.  Moderate to severe arthritic changes of the bilateral AC joints, no arthritic changes of the bilateral glenohumeral joints.          Assessment:     Gallito Wayne is a 44 y.o. male with bilateral shoulder impingement and biceps tendinitis  Encounter Diagnosis   Name Primary?    Tendinitis of long head of biceps brachii of left shoulder Yes          Plan:       Assessment & Plan    LIFESTYLE:  - Add resistance band exercises for shoulder rehabilitation.    RETURN TO ACTIVITY:  - Advised to gradually return to upper body exercises, starting with lower weights and working back up. Recommend dropping weight and starting lower when returning to exercises like  press, overhead shoulder flies, and bench press. Continue lower body exercises and cardio.    FOLLOW UP:  - Return if shoulder pain has not improved.          This note was generated with the assistance of ambient listening technology. Verbal consent was obtained by the patient and accompanying visitor(s) for the recording of patient appointment to facilitate this note. I attest to having reviewed and edited the generated note for accuracy, though some syntax or spelling errors may persist. Please contact the author of this note for any clarification.

## 2025-01-01 ENCOUNTER — PATIENT MESSAGE (OUTPATIENT)
Dept: FAMILY MEDICINE | Facility: CLINIC | Age: 45
End: 2025-01-01
Payer: COMMERCIAL

## 2025-01-01 DIAGNOSIS — E66.813 CLASS 3 SEVERE OBESITY DUE TO EXCESS CALORIES WITH SERIOUS COMORBIDITY AND BODY MASS INDEX (BMI) OF 40.0 TO 44.9 IN ADULT: Primary | ICD-10-CM

## 2025-01-01 DIAGNOSIS — E66.01 CLASS 3 SEVERE OBESITY DUE TO EXCESS CALORIES WITH SERIOUS COMORBIDITY AND BODY MASS INDEX (BMI) OF 40.0 TO 44.9 IN ADULT: Primary | ICD-10-CM

## 2025-01-02 RX ORDER — TIRZEPATIDE 5 MG/.5ML
5 INJECTION, SOLUTION SUBCUTANEOUS
Qty: 4 PEN | Refills: 1 | Status: SHIPPED | OUTPATIENT
Start: 2025-01-02

## 2025-01-27 ENCOUNTER — TELEPHONE (OUTPATIENT)
Dept: FAMILY MEDICINE | Facility: CLINIC | Age: 45
End: 2025-01-27
Payer: COMMERCIAL

## 2025-01-27 NOTE — TELEPHONE ENCOUNTER
----- Message from Hyacinth Delarosa NP sent at 3/18/2024  1:47 PM CDT -----  Regarding: wellness  F. Labs and wellness due 3/18/2025

## 2025-01-28 ENCOUNTER — TELEPHONE (OUTPATIENT)
Dept: FAMILY MEDICINE | Facility: CLINIC | Age: 45
End: 2025-01-28
Payer: COMMERCIAL

## 2025-01-28 NOTE — TELEPHONE ENCOUNTER
----- Message from Hyacinth Delarosa NP sent at 4/3/2024  7:47 PM CDT -----  Regarding: wellness exam  F. Labs and wellness exam due 3/19/2025

## 2025-01-29 NOTE — TELEPHONE ENCOUNTER
Patient is currently working off [facility]- letter mailed to patient

## 2025-02-05 DIAGNOSIS — I10 PRIMARY HYPERTENSION: ICD-10-CM

## 2025-02-05 RX ORDER — VALSARTAN 80 MG/1
80 TABLET ORAL DAILY
Qty: 90 TABLET | Refills: 0 | Status: SHIPPED | OUTPATIENT
Start: 2025-02-05 | End: 2026-02-05

## 2025-02-06 NOTE — TELEPHONE ENCOUNTER
Call patient- unable to leave message, mailbox is full- I mailed a letter to patient on 01/29- advising him its time for his fasting and appt

## 2025-03-06 ENCOUNTER — PATIENT MESSAGE (OUTPATIENT)
Dept: FAMILY MEDICINE | Facility: CLINIC | Age: 45
End: 2025-03-06
Payer: COMMERCIAL

## 2025-03-06 DIAGNOSIS — E66.01 CLASS 3 SEVERE OBESITY DUE TO EXCESS CALORIES WITH SERIOUS COMORBIDITY AND BODY MASS INDEX (BMI) OF 40.0 TO 44.9 IN ADULT: ICD-10-CM

## 2025-03-06 DIAGNOSIS — E66.813 CLASS 3 SEVERE OBESITY DUE TO EXCESS CALORIES WITH SERIOUS COMORBIDITY AND BODY MASS INDEX (BMI) OF 40.0 TO 44.9 IN ADULT: ICD-10-CM

## 2025-03-06 RX ORDER — TIRZEPATIDE 5 MG/.5ML
5 INJECTION, SOLUTION SUBCUTANEOUS
Qty: 4 PEN | Refills: 1 | OUTPATIENT
Start: 2025-03-06

## 2025-03-06 NOTE — TELEPHONE ENCOUNTER
Barbara - patient will see me as scheduled in April 2025 for wellness exam - set him up for labs 1 week before MY visit.    Put him on Rojelio schedule next Friday for Zepbound management - I already spoke with Cynthia about it.

## 2025-03-06 NOTE — TELEPHONE ENCOUNTER
Patient was supposed to have set up fasting labs and wellness exam before due for refill on medication - he should have set up appt before he left or reached out to us as I just STARTED PATIENT ON ZEPBOUD - likely need to change dose.    Get him in here next week - fasting labs first

## 2025-03-14 ENCOUNTER — OFFICE VISIT (OUTPATIENT)
Dept: FAMILY MEDICINE | Facility: CLINIC | Age: 45
End: 2025-03-14
Payer: COMMERCIAL

## 2025-03-14 VITALS
HEART RATE: 64 BPM | WEIGHT: 231.38 LBS | OXYGEN SATURATION: 96 % | BODY MASS INDEX: 35.18 KG/M2 | SYSTOLIC BLOOD PRESSURE: 118 MMHG | DIASTOLIC BLOOD PRESSURE: 72 MMHG | TEMPERATURE: 98 F

## 2025-03-14 DIAGNOSIS — E66.812 CLASS 2 OBESITY WITH BODY MASS INDEX (BMI) OF 35.0 TO 35.9 IN ADULT, UNSPECIFIED OBESITY TYPE, UNSPECIFIED WHETHER SERIOUS COMORBIDITY PRESENT: Primary | ICD-10-CM

## 2025-03-14 DIAGNOSIS — R73.03 PREDIABETES: ICD-10-CM

## 2025-03-14 DIAGNOSIS — I10 PRIMARY HYPERTENSION: ICD-10-CM

## 2025-03-14 PROCEDURE — 99999 PR PBB SHADOW E&M-EST. PATIENT-LVL III: CPT | Mod: PBBFAC,,, | Performed by: NURSE PRACTITIONER

## 2025-03-14 RX ORDER — TIRZEPATIDE 5 MG/.5ML
5 INJECTION, SOLUTION SUBCUTANEOUS
Qty: 4 PEN | Refills: 5 | Status: SHIPPED | OUTPATIENT
Start: 2025-03-14

## 2025-03-14 NOTE — PROGRESS NOTES
Patient ID: Glalito Wayne is a 45 y.o. male.     Chief Complaint: Follow-up (Medication follow up )      HPI:  History of Present Illness    CHIEF COMPLAINT:  Patient presents today for medication follow-up for Zepbound.    WEIGHT MANAGEMENT:  He has lost 34 lbs since December 17th, decreasing from 265 to 231 lbs with a goal weight of 190 lbs. He actively exercises at the gym, monitors dietary intake, and supplements with [employer] protein shakes. He reports good response to Zepbound 5 mg with reduction in food cravings. He denies medication side effects.    MEDICAL HISTORY:  He has a history of pre-diabetes.    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      ROS:  General: -fever, -chills, -fatigue, -weight gain, +weight loss  Eyes: -vision changes, -redness, -discharge  ENT: -ear pain, -nasal congestion, -sore throat  Cardiovascular: -chest pain, -palpitations, -lower extremity edema  Respiratory: -cough, -shortness of breath  Gastrointestinal: -abdominal pain, -nausea, -vomiting, -diarrhea, -constipation, -blood in stool  Genitourinary: -dysuria, -hematuria, -frequency  Musculoskeletal: -joint pain, -muscle pain  Skin: -rash, -lesion  Neurological: -headache, -dizziness, -numbness, -tingling  Psychiatric: -anxiety, -depression, -sleep difficulty                Currently Medications  Medications Ordered Prior to Encounter[1]    Allergies  Review of patient's allergies indicates:  No Known Allergies     Health Maintenance  Health Maintenance Topics with due status: Not Due       Topic Last Completion Date    TETANUS VACCINE 01/04/2016    Hemoglobin A1c (Prediabetes) 12/13/2024    Lipid Panel 12/13/2024    RSV Vaccine (Age 60+ and Pregnant patients) Not Due          PMH:  Past Medical History:   Diagnosis Date    Chest pain 01/04/2016    Class 2 obesity due to excess calories without serious comorbidity with body mass index (BMI) of 37.0 to 37.9 in adult  01/22/2015    Headache     History of percutaneous left heart catheterization 2023    had heart catherization 2023 in [facility] for chest pain - showed no obstructions.    Psoriasis 08/18/2017      Past Surgical History:   Procedure Laterality Date    BREAST SURGERY      Hormonal growth    CARDIAC CATHETERIZATION  2023    [facility] - no blockages noted.    REFRACTIVE SURGERY  2009          Physical  Exam  Vitals:    03/14/25 0922   BP: 118/72   BP Location: Left arm   Patient Position: Sitting   Pulse: 64   Temp: 97.7 °F (36.5 °C)   SpO2: 96%   Weight: 104.9 kg (231 lb 6 oz)      Body mass index is 35.18 kg/m².  Wt Readings from Last 3 Encounters:   03/14/25 104.9 kg (231 lb 6 oz)   12/23/24 119 kg (262 lb 7.3 oz)   12/17/24 120.3 kg (265 lb 3.4 oz)       Physical Exam  Vitals and nursing note reviewed.   Constitutional:       Appearance: Normal appearance.   HENT:      Head: Normocephalic.      Mouth/Throat:      Mouth: Mucous membranes are moist.   Eyes:      Pupils: Pupils are equal, round, and reactive to light.   Cardiovascular:      Rate and Rhythm: Normal rate and regular rhythm.      Pulses: Normal pulses.      Heart sounds: Normal heart sounds.   Pulmonary:      Effort: Pulmonary effort is normal.      Breath sounds: Normal breath sounds.   Musculoskeletal:         General: Normal range of motion.      Cervical back: Normal range of motion and neck supple.   Skin:     General: Skin is warm and dry.      Capillary Refill: Capillary refill takes less than 2 seconds.   Neurological:      Mental Status: He is alert and oriented to person, place, and time.   Psychiatric:         Mood and Affect: Mood normal.              Assessment/Plan:  1. Class 2 obesity with body mass index (BMI) of 35.0 to 35.9 in adult, unspecified obesity type, unspecified whether serious comorbidity present  - tirzepatide, weight loss, (ZEPBOUND) 5 mg/0.5 mL PnIj; Inject 5 mg into the skin every 7 days.  Dispense: 4 Pen; Refill:  "5    2. Prediabetes    3. Primary hypertension       Assessment & Plan    IMPRESSION:  - Patient achieved significant weight loss of 34 lbs since starting Zepbound 3 months ago, with BMI reduction from 41 to 35.  - Current dose of 5 mg is effective in managing food cravings; recommended to maintain this dose. Continued for 3-6 more months, then reassess.  - Strategy for tapering: reduce to 2.5 mg for a few months after reaching goal weight to ensure sustained appetite control.  - Anticipate weight loss to taper off, aiming for 1-2 lbs per week.  - Blood pressure improved, now at 118/72.  - Expect improvements in prediabetes due to semaglutide's effects on glucose metabolism.    OBESITY (BMI 35.0-35.9):  - Monitored the patient's weight, which has decreased from 265 lbs on December 17th to 231 lbs today, resulting in a total weight loss of 34 lbs.  - Evaluated the patient's current weight at 231 lbs with a BMI of 35, which is classified as class 2 obesity.  - Assessed the significant weight loss and improvement in obesity classification, moving from class 3 obesity to class 2 obesity.  - Discussed the patient's goal weight of 190 lbs and expectations for future weight loss.  - Explained the concept of "food noise" and how semaglutide helps reduce it.  - Educated the patient on the importance of protein intake during rapid weight loss to maintain muscle mass and prevent facial volume loss.  - Informed the patient about the expected tapering of weight loss rate and the potential for accelerated loss near goal weight.  - Discussed the difference between fat and muscle weight, and how exercise might affect the scale despite continued fat loss.  - Recommend continuing the current dose of medication (5mg) for weight management and discussed future plan to reduce dose to 2.5mg when goal weight is reached.  - Instructed the patient to contact the office through the patient portal if reaching goal weight before the 6-month " follow-up to discuss potential dose reduction.  - Patient to maintain high protein intake, including use of protein shakes.  - Patient to continue current exercise regimen at the gym.    PREDIABETES:  - Monitored the patient's previous prediabetic status.  - Evaluated the expected improvement in prediabetic status due to weight loss and medication.  - Assessed that the patient's previous lab results indicated prediabetes.  - Continued current medication which is expected to improve prediabetic status as it is also used for diabetes treatment.     FOLLOW-UP:  - Scheduled follow up in 6 months.   - Follow up in April for annual wellness and orders for colorectal cancer screening.  - Contact office if any concerns arise before next appointment.              This note was generated with the assistance of ambient listening technology. Verbal consent was obtained by the patient and accompanying visitor(s) for the recording of patient appointment to facilitate this note. I attest to having reviewed and edited the generated note for accuracy, though some syntax or spelling errors may persist. Please contact the author of this note for any clarification.         Cynthia Gu NP  [facility]   03/14/2025            [1]   Current Outpatient Medications on File Prior to Visit   Medication Sig Dispense Refill    ascorbic acid, vitamin C, (VITAMIN C) 500 MG tablet Take 500 mg by mouth once daily.      cetirizine (ZYRTEC) 10 MG tablet Take 10 mg by mouth once daily.      fexofenadine (ALLEGRA) 180 MG tablet Take 180 mg by mouth once daily.      roflumilast (ZORYVE) 0.3 % Crea Apply to affected area once daily for psoriasis; can use in groin 60 g 3    valsartan (DIOVAN) 80 MG tablet Take 1 tablet (80 mg total) by mouth once daily. 90 tablet 0    [DISCONTINUED] fluconazole (DIFLUCAN) 200 MG Tab Take 1 tablet by mouth once daily for 3 days and repeat in 1 week 6 tablet 1    [DISCONTINUED] tirzepatide, weight loss,  (ZEPBOUND) 5 mg/0.5 mL PnIj Inject 5 mg into the skin every 7 days. 4 Pen 1    [DISCONTINUED] triamcinolone acetonide 0.1% (KENALOG) 0.1 % cream Apply to affected area twice daily 80 g 0     No current facility-administered medications on file prior to visit.

## 2025-04-21 ENCOUNTER — RESULTS FOLLOW-UP (OUTPATIENT)
Dept: FAMILY MEDICINE | Facility: CLINIC | Age: 45
End: 2025-04-21

## 2025-04-23 NOTE — PROGRESS NOTES
Subjective:       Patient ID: Gallito Wayne is a 45 y.o. male.    Chief Complaint: Annual Exam        HPI WITH ASSESSMENT AND PLAN OF CARE:      Patient is a 45-year-old white male with psoriasis, prediabetes, chronic GERD, and obesity with a history of a normal left heart catheterization in 2023 head is here today for ANNUAL WELLNESS EXAM with fasting lab results.         Hypertension  Diagnosed 11/7/2024  Started Valsartan 80 mg daily  BP much improved  BP Readings from Last 3 Encounters:   04/24/25 106/66   03/14/25 118/72   12/17/24 116/84    Stay on same medication  Recheck in 8 weeks           History of Prediabetes   Hemoglobin A1c 5.7% in March 2023.  HgbA1C 5.2 now  Will work on lifestyle modifications- currently on Zepbound for weight loss  Will recheck in 1 year           Obesity   Body mass index is 34.12 kg/m².  Working on lifestyle modifications   STARTED Zepbound on 12/17/2024  Currently on 5mg   Has lost 41 lb since start  Increase to Zepbound 7.5 mg weekly  Recheck in 8 weeks           Chronic GERD   Reports chronic reflux for years.    Had atypical chest wall pain and had a left heart catheterization in 2023 in [place] that was normal and was advised to follow up with GI for an EGD.  Patient reports he changed to mostly a plant based diet in early 2024 and was able to get off of medication  Advised patient that if symptoms recur that requires him to treat with medication that he should have an EGD to rule out any Triplett's esophagus, hiatal hernia, H pylori, etc.  Patient verbalizes understanding and will message me if symptoms recur and I will place referral to GI  Reports no recent GI/GERD issues in past 6 months.        History of heart catheterization   Patient works in [place] 28 days on/28 days off  Had atypical chest wall pain in early 2023.  Chest pains would not resolve so he had a heart catheterization done in early 2023 and states that there was no blockages.         Psoriasis  Positive psoriasis plaques to elbows and knees   Uses topical steroids p.r.n.        Environmental and seasonal allergies  Rotates Zyrtec or Allegra daily   Uses Flonase as needed p.r.n.  Stable    Wellness Labs:  CBC WNL  CMP WNL  Cholesterol WNL  TSH WNL  A1C 5.2%    Health Maintenance:  Colonoscopy ordered per patient request/preference    Lab Visit on 04/19/2025   Component Date Value Ref Range Status    Sodium 04/19/2025 141  136 - 145 mmol/L Final    Potassium 04/19/2025 4.9  3.5 - 5.1 mmol/L Final    Chloride 04/19/2025 110  95 - 110 mmol/L Final    CO2 04/19/2025 26  23 - 29 mmol/L Final    Glucose 04/19/2025 99  70 - 110 mg/dL Final    BUN 04/19/2025 13  6 - 20 mg/dL Final    Creatinine 04/19/2025 1.1  0.5 - 1.4 mg/dL Final    Calcium 04/19/2025 9.4  8.7 - 10.5 mg/dL Final    Protein Total 04/19/2025 7.4  6.0 - 8.4 gm/dL Final    Albumin 04/19/2025 4.2  3.5 - 5.2 g/dL Final    Bilirubin Total 04/19/2025 0.4  0.1 - 1.0 mg/dL Final    For infants and newborns, interpretation of results should be based   on gestational age, weight and in agreement with clinical   observations.    Premature Infant recommended reference ranges:   0-24 hours:  <8.0 mg/dL   24-48 hours: <12.0 mg/dL   3-5 days:    <15.0 mg/dL   6-29 days:   <15.0 mg/dL    ALP 04/19/2025 55  40 - 150 unit/L Final    AST 04/19/2025 18  11 - 45 unit/L Final    ALT 04/19/2025 19  10 - 44 unit/L Final    Anion Gap 04/19/2025 5 (L)  8 - 16 mmol/L Final    eGFR 04/19/2025 >60  >60 mL/min/1.73/m2 Final    Estimated GFR calculated using the CKD-EPI creatinine (2021) equation.    Hemoglobin A1c 04/19/2025 5.2  4.0 - 5.6 % Final    ADA Screening Guidelines:  5.7-6.4%  Consistent with prediabetes  >=6.5%  Consistent with diabetes    High levels of fetal hemoglobin interfere with the HbA1C  assay. Heterozygous hemoglobin variants (HbS, HgC, etc)do  not significantly interfere with this assay.   However, presence of multiple variants may affect  accuracy.    Estimated Average Glucose 04/19/2025 103  68 - 131 mg/dL Final    Cholesterol Total 04/19/2025 179  120 - 199 mg/dL Final    The National Cholesterol Education Program (NCEP) has set the  following guidelines (reference ranges) for Cholesterol:  Optimal.....................<200 mg/dL  Borderline High.............200-239 mg/dL  High........................> or = 240 mg/dL    Triglyceride 04/19/2025 64  30 - 150 mg/dL Final    The National Cholesterol Education Program (NCEP) has set the  following guidelines (reference values) for triglycerides:  Normal......................<150 mg/dL  Borderline High.............150-199 mg/dL  High........................200-499 mg/dL    HDL Cholesterol 04/19/2025 56  40 - 75 mg/dL Final    The National Cholesterol Education Program (NCEP) has set the   following guidelines (reference values) for HDL Cholesterol:   Low...............<40 mg/dL   Optimal...........>60 mg/dL    LDL Cholesterol 04/19/2025 110.2  63.0 - 159.0 mg/dL Final    The National Cholesterol Education Program (NCEP) has set the  following guidelines (reference values) for LDL Cholesterol:  Optimal.......................<130 mg/dL  Borderline High...............130-159 mg/dL  High..........................160-189 mg/dL  Very High.....................>190 mg/dL  LDL calculated using the Friedewald equation.    HDL/Cholesterol Ratio 04/19/2025 31.3  20.0 - 50.0 % Final    Cholesterol/HDL Ratio 04/19/2025 3.2  2.0 - 5.0 Final    Non HDL Cholesterol 04/19/2025 123  mg/dL Final    Risk category and Non-HDL cholesterol goals:  Coronary heart disease (CHD)or equivalent (10-year risk of CHD >20%):  Non-HDL cholesterol goal     <130 mg/dL  Two or more CHD risk factors and 10-year risk of CHD <= 20%:  Non-HDL cholesterol goal     <160 mg/dL  0 to 1 CHD risk factor:  Non-HDL cholesterol goal     <190 mg/dL    TSH 04/19/2025 1.746  0.400 - 4.000 uIU/mL Final    WBC 04/19/2025 8.01  3.90 - 12.70 K/uL Final    RBC  "04/19/2025 5.14  4.60 - 6.20 M/uL Final    HGB 04/19/2025 15.3  14.0 - 18.0 gm/dL Final    HCT 04/19/2025 45.9  40.0 - 54.0 % Final    MCV 04/19/2025 89  82 - 98 fL Final    MCH 04/19/2025 29.8  27.0 - 31.0 pg Final    MCHC 04/19/2025 33.3  32.0 - 36.0 g/dL Final    RDW 04/19/2025 13.1  11.5 - 14.5 % Final    Platelet Count 04/19/2025 313  150 - 450 K/uL Final    MPV 04/19/2025 9.7  9.2 - 12.9 fL Final    Nucleated RBC 04/19/2025 0  <=0 /100 WBC Final    Neut % 04/19/2025 58.8  38 - 73 % Final    Lymph % 04/19/2025 30.1  18 - 48 % Final    Mono % 04/19/2025 8.2  4 - 15 % Final    Eos % 04/19/2025 2.2  <=8 % Final    Basophil % 04/19/2025 0.5  <=1.9 % Final    Imm Grans % 04/19/2025 0.2  0.0 - 0.5 % Final    Lymph # 04/19/2025 2.41  1 - 4.8 K/uL Final    Mono # 04/19/2025 0.66  0.3 - 1 K/uL Final    Eos # 04/19/2025 0.18  <=0.5 K/uL Final    Baso # 04/19/2025 0.04  <=0.2 K/uL Final    Imm Grans # 04/19/2025 0.02  0.00 - 0.04 K/uL Final    Mild elevation in immature granulocytes is non specific and can be seen in a variety of conditions including stress response, acute inflammation, trauma and pregnancy. Correlation with other laboratory and clinical findings is essential.       Vitals:    04/24/25 1306   BP: 106/66   BP Location: Right arm   Patient Position: Sitting   Pulse: 88   Temp: 98.1 °F (36.7 °C)   TempSrc: Temporal   SpO2: 99%   Weight: 101.8 kg (224 lb 6.9 oz)   Height: 5' 8" (1.727 m)         Diagnoses this Encounter:         ICD-10-CM ICD-9-CM   1. Annual physical exam  Z00.00 V70.0   2. Primary hypertension  I10 401.9   3. History of prediabetes  Z87.898 V12.29   4. Class 1 obesity due to excess calories with serious comorbidity and body mass index (BMI) of 34.0 to 34.9 in adult  E66.811 278.00    E66.09 V85.34    Z68.34    5. Chronic GERD  K21.9 530.81   6. History of percutaneous left heart catheterization  Z98.890 V15.1   7. Psoriasis  L40.9 696.1   8. Environmental and seasonal allergies  J30.89 " 477.8   9. Colon cancer screening  Z12.11 V76.51       Orders Placed This Encounter    Ambulatory referral/consult to Endo Procedure     tirzepatide, weight loss, (ZEPBOUND) 7.5 mg/0.5 mL PnIj    valsartan (DIOVAN) 80 MG tablet        Follow up in about 8 weeks (around 6/19/2025) for medication follow up.     Patient's Medications   New Prescriptions    TIRZEPATIDE, WEIGHT LOSS, (ZEPBOUND) 7.5 MG/0.5 ML PNIJ    Inject 7.5 mg into the skin every 7 days.   Previous Medications    ASCORBIC ACID, VITAMIN C, (VITAMIN C) 500 MG TABLET    Take 500 mg by mouth once daily.    CETIRIZINE (ZYRTEC) 10 MG TABLET    Take 10 mg by mouth once daily.    FEXOFENADINE (ALLEGRA) 180 MG TABLET    Take 180 mg by mouth once daily.    ROFLUMILAST (ZORYVE) 0.3 % CREA    Apply to affected area once daily for psoriasis; can use in groin   Modified Medications    Modified Medication Previous Medication    VALSARTAN (DIOVAN) 80 MG TABLET valsartan (DIOVAN) 80 MG tablet       Take 1 tablet (80 mg total) by mouth once daily.    Take 1 tablet (80 mg total) by mouth once daily.   Discontinued Medications    TIRZEPATIDE, WEIGHT LOSS, (ZEPBOUND) 5 MG/0.5 ML PNIJ    Inject 5 mg into the skin every 7 days.         Review of Systems   Constitutional:  Negative for activity change and unexpected weight change.   HENT:  Negative for hearing loss, rhinorrhea and trouble swallowing.    Eyes:  Negative for discharge and visual disturbance.   Respiratory:  Negative for chest tightness and wheezing.    Cardiovascular:  Negative for chest pain and palpitations.   Gastrointestinal:  Negative for blood in stool, constipation, diarrhea and vomiting.   Endocrine: Negative for polydipsia and polyuria.   Genitourinary:  Negative for difficulty urinating, hematuria and urgency.   Musculoskeletal:  Negative for arthralgias, joint swelling and neck pain.   Neurological:  Negative for weakness and headaches.   Psychiatric/Behavioral:  Negative for confusion and  dysphoric mood.          Objective:        Physical Exam  Constitutional:       General: He is not in acute distress.     Appearance: Normal appearance. He is obese. He is not toxic-appearing or diaphoretic.      Comments: Body mass index is 34.12 kg/m².       HENT:      Right Ear: Tympanic membrane, ear canal and external ear normal.      Left Ear: Tympanic membrane, ear canal and external ear normal.      Nose: No congestion or rhinorrhea.      Mouth/Throat:      Pharynx: No oropharyngeal exudate or posterior oropharyngeal erythema.   Eyes:      Extraocular Movements: Extraocular movements intact.      Conjunctiva/sclera: Conjunctivae normal.   Cardiovascular:      Rate and Rhythm: Normal rate and regular rhythm.      Heart sounds: Normal heart sounds.   Pulmonary:      Effort: Pulmonary effort is normal. No respiratory distress.      Breath sounds: Normal breath sounds.   Abdominal:      General: Bowel sounds are normal. There is no distension.      Palpations: Abdomen is soft. There is no mass.      Tenderness: There is no abdominal tenderness. There is no guarding.      Hernia: No hernia is present.   Musculoskeletal:         General: Normal range of motion.      Cervical back: Normal range of motion.      Right lower leg: No edema.      Left lower leg: No edema.   Lymphadenopathy:      Cervical: No cervical adenopathy.   Skin:     General: Skin is warm and dry.      Comments: + plaque psoriasis to knees - see pictures below.   Neurological:      Mental Status: He is alert and oriented to person, place, and time.   Psychiatric:         Mood and Affect: Mood normal.         Behavior: Behavior normal.                       Past Medical History:   Diagnosis Date    Chest pain 01/04/2016    Class 2 obesity due to excess calories without serious comorbidity with body mass index (BMI) of 37.0 to 37.9 in adult 01/22/2015    Headache     History of percutaneous left heart catheterization 2023    had heart catherization  2023 in [place] for chest pain - showed no obstructions.    Hypertension     Psoriasis 08/18/2017       Past Surgical History:   Procedure Laterality Date    BREAST SURGERY      Hormonal growth    CARDIAC CATHETERIZATION  2023    [facility] - no blockages noted.    REFRACTIVE SURGERY  2009       Family History   Problem Relation Name Age of Onset    Hypertension Mother Eloise Wayne     Diabetes Mother Eloise Wayne         type 2 diabete    Heart disease Mother Eloise Wayne         A-Fib , cryoablasion    Hyperlipidemia Mother Eloise Wayne     Early death Father Gennaro Wayne 44        Diabetic complications    Diabetes Father Gennaro Wayne         type 1 diabetes    Diabetes Sister 1     Cancer Paternal Grandfather Quincy Wayne         Lymphoma; prostate cancer at age 90    Prostate cancer Neg Hx      Kidney disease Neg Hx         Social History     Socioeconomic History    Marital status:    Tobacco Use    Smoking status: Never    Smokeless tobacco: Never   Substance and Sexual Activity    Alcohol use: Yes     Alcohol/week: 4.0 standard drinks of alcohol     Types: 4 Cans of beer per week     Comment: socially    Drug use: No    Sexual activity: Yes     Partners: Female     Birth control/protection: None     Social Drivers of Health     Financial Resource Strain: Low Risk  (3/17/2024)    Overall Financial Resource Strain (CARDIA)     Difficulty of Paying Living Expenses: Not hard at all   Food Insecurity: No Food Insecurity (3/17/2024)    Hunger Vital Sign     Worried About Running Out of Food in the Last Year: Never true     Ran Out of Food in the Last Year: Never true   Transportation Needs: No Transportation Needs (3/17/2024)    PRAPARE - Transportation     Lack of Transportation (Medical): No     Lack of Transportation (Non-Medical): No   Physical Activity: Insufficiently Active (3/17/2024)    Exercise Vital Sign     Days of Exercise per Week: 3 days     Minutes of  Exercise per Session: 30 min   Stress: No Stress Concern Present (3/17/2024)    [employer] - Occupational Stress Questionnaire     Feeling of Stress : Not at all   Housing Stability: Unknown (3/17/2024)    Housing Stability Vital Sign     Unable to Pay for Housing in the Last Year: No     Unstable Housing in the Last Year: No

## 2025-04-24 ENCOUNTER — OFFICE VISIT (OUTPATIENT)
Dept: FAMILY MEDICINE | Facility: CLINIC | Age: 45
End: 2025-04-24
Payer: COMMERCIAL

## 2025-04-24 VITALS
DIASTOLIC BLOOD PRESSURE: 66 MMHG | WEIGHT: 224.44 LBS | BODY MASS INDEX: 34.01 KG/M2 | SYSTOLIC BLOOD PRESSURE: 106 MMHG | OXYGEN SATURATION: 99 % | HEART RATE: 88 BPM | TEMPERATURE: 98 F | HEIGHT: 68 IN

## 2025-04-24 DIAGNOSIS — I10 PRIMARY HYPERTENSION: ICD-10-CM

## 2025-04-24 DIAGNOSIS — J30.89 ENVIRONMENTAL AND SEASONAL ALLERGIES: ICD-10-CM

## 2025-04-24 DIAGNOSIS — K21.9 CHRONIC GERD: ICD-10-CM

## 2025-04-24 DIAGNOSIS — L40.9 PSORIASIS: ICD-10-CM

## 2025-04-24 DIAGNOSIS — Z00.00 ANNUAL PHYSICAL EXAM: Primary | ICD-10-CM

## 2025-04-24 DIAGNOSIS — Z12.11 COLON CANCER SCREENING: ICD-10-CM

## 2025-04-24 DIAGNOSIS — E66.811 CLASS 1 OBESITY DUE TO EXCESS CALORIES WITH SERIOUS COMORBIDITY AND BODY MASS INDEX (BMI) OF 34.0 TO 34.9 IN ADULT: ICD-10-CM

## 2025-04-24 DIAGNOSIS — E66.09 CLASS 1 OBESITY DUE TO EXCESS CALORIES WITH SERIOUS COMORBIDITY AND BODY MASS INDEX (BMI) OF 34.0 TO 34.9 IN ADULT: ICD-10-CM

## 2025-04-24 DIAGNOSIS — Z87.898 HISTORY OF PREDIABETES: ICD-10-CM

## 2025-04-24 DIAGNOSIS — Z98.890: ICD-10-CM

## 2025-04-24 PROCEDURE — 3078F DIAST BP <80 MM HG: CPT | Mod: CPTII,S$GLB,, | Performed by: NURSE PRACTITIONER

## 2025-04-24 PROCEDURE — 99999 PR PBB SHADOW E&M-EST. PATIENT-LVL IV: CPT | Mod: PBBFAC,,, | Performed by: NURSE PRACTITIONER

## 2025-04-24 PROCEDURE — 3008F BODY MASS INDEX DOCD: CPT | Mod: CPTII,S$GLB,, | Performed by: NURSE PRACTITIONER

## 2025-04-24 PROCEDURE — 3074F SYST BP LT 130 MM HG: CPT | Mod: CPTII,S$GLB,, | Performed by: NURSE PRACTITIONER

## 2025-04-24 PROCEDURE — 4010F ACE/ARB THERAPY RXD/TAKEN: CPT | Mod: CPTII,S$GLB,, | Performed by: NURSE PRACTITIONER

## 2025-04-24 PROCEDURE — 1159F MED LIST DOCD IN RCRD: CPT | Mod: CPTII,S$GLB,, | Performed by: NURSE PRACTITIONER

## 2025-04-24 PROCEDURE — 99396 PREV VISIT EST AGE 40-64: CPT | Mod: S$GLB,,, | Performed by: NURSE PRACTITIONER

## 2025-04-24 PROCEDURE — 3044F HG A1C LEVEL LT 7.0%: CPT | Mod: CPTII,S$GLB,, | Performed by: NURSE PRACTITIONER

## 2025-04-24 PROCEDURE — 1160F RVW MEDS BY RX/DR IN RCRD: CPT | Mod: CPTII,S$GLB,, | Performed by: NURSE PRACTITIONER

## 2025-04-24 RX ORDER — TIRZEPATIDE 7.5 MG/.5ML
7.5 INJECTION, SOLUTION SUBCUTANEOUS
Qty: 4 PEN | Refills: 1 | Status: SHIPPED | OUTPATIENT
Start: 2025-04-24

## 2025-04-24 RX ORDER — VALSARTAN 80 MG/1
80 TABLET ORAL DAILY
Qty: 90 TABLET | Refills: 0 | Status: SHIPPED | OUTPATIENT
Start: 2025-04-24 | End: 2026-04-24

## 2025-04-25 ENCOUNTER — TELEPHONE (OUTPATIENT)
Dept: ENDOSCOPY | Facility: HOSPITAL | Age: 45
End: 2025-04-25

## 2025-04-25 ENCOUNTER — CLINICAL SUPPORT (OUTPATIENT)
Dept: ENDOSCOPY | Facility: HOSPITAL | Age: 45
End: 2025-04-25
Payer: COMMERCIAL

## 2025-04-25 VITALS — WEIGHT: 224 LBS | HEIGHT: 68 IN | BODY MASS INDEX: 33.95 KG/M2

## 2025-04-25 DIAGNOSIS — Z12.11 COLON CANCER SCREENING: Primary | ICD-10-CM

## 2025-04-25 DIAGNOSIS — Z12.11 COLON CANCER SCREENING: ICD-10-CM

## 2025-04-25 RX ORDER — POLYETHYLENE GLYCOL 3350, SODIUM SULFATE ANHYDROUS, SODIUM BICARBONATE, SODIUM CHLORIDE, POTASSIUM CHLORIDE 236; 22.74; 6.74; 5.86; 2.97 G/4L; G/4L; G/4L; G/4L; G/4L
4 POWDER, FOR SOLUTION ORAL ONCE
Qty: 4000 ML | Refills: 0 | Status: SHIPPED | OUTPATIENT
Start: 2025-04-25 | End: 2025-04-29

## 2025-04-25 NOTE — TELEPHONE ENCOUNTER
Referral for procedure from PAT appointment      Spoke to patient to schedule procedure(s) Colonoscopy       Physician to perform procedure(s) Dr. JENNIFER Mendez  Date of Procedure (s) 5/16/25  Arrival Time 11:30 AM  Time of Procedure(s) 12:30 PM   Location of Procedure(s) [facility] [address] Floor  Type of Rx Prep sent to patient: PEG  Instructions provided to patient via [facility]    Patient was informed on the following information and verbalized understanding. Screening questionnaire reviewed with patient and complete. If procedure requires anesthesia, a responsible adult needs to be present to accompany the patient home, patient cannot drive after receiving anesthesia. Appointment details are tentative, especially check-in time. Patient will receive a prep-op call 7 days prior to confirm check-in time for procedure. If applicable the patient should contact their pharmacy to verify Rx for procedure prep is ready for pick-up. Patient was advised to call the scheduling department at [phone] if pharmacy states no Rx is available. Patient was advised to call the endoscopy scheduling department if any questions or concerns arise.      [facility] Scheduling Department

## 2025-05-08 ENCOUNTER — TELEPHONE (OUTPATIENT)
Dept: ENDOSCOPY | Facility: HOSPITAL | Age: 45
End: 2025-05-08
Payer: COMMERCIAL

## 2025-06-20 ENCOUNTER — OFFICE VISIT (OUTPATIENT)
Dept: FAMILY MEDICINE | Facility: CLINIC | Age: 45
End: 2025-06-20
Payer: COMMERCIAL

## 2025-06-20 VITALS
SYSTOLIC BLOOD PRESSURE: 112 MMHG | TEMPERATURE: 98 F | OXYGEN SATURATION: 95 % | HEART RATE: 80 BPM | DIASTOLIC BLOOD PRESSURE: 78 MMHG | HEIGHT: 68 IN | WEIGHT: 212.44 LBS | BODY MASS INDEX: 32.2 KG/M2

## 2025-06-20 DIAGNOSIS — L40.9 PSORIASIS: ICD-10-CM

## 2025-06-20 DIAGNOSIS — Z98.890: ICD-10-CM

## 2025-06-20 DIAGNOSIS — Z87.19 HISTORY OF GASTROESOPHAGEAL REFLUX (GERD): ICD-10-CM

## 2025-06-20 DIAGNOSIS — I10 PRIMARY HYPERTENSION: Primary | ICD-10-CM

## 2025-06-20 DIAGNOSIS — Z86.0100 HISTORY OF COLON POLYPS: ICD-10-CM

## 2025-06-20 DIAGNOSIS — E66.09 CLASS 1 OBESITY DUE TO EXCESS CALORIES WITH SERIOUS COMORBIDITY AND BODY MASS INDEX (BMI) OF 32.0 TO 32.9 IN ADULT: ICD-10-CM

## 2025-06-20 DIAGNOSIS — Z87.898 HISTORY OF PREDIABETES: ICD-10-CM

## 2025-06-20 DIAGNOSIS — E66.811 CLASS 1 OBESITY DUE TO EXCESS CALORIES WITH SERIOUS COMORBIDITY AND BODY MASS INDEX (BMI) OF 32.0 TO 32.9 IN ADULT: ICD-10-CM

## 2025-06-20 DIAGNOSIS — J30.89 ENVIRONMENTAL AND SEASONAL ALLERGIES: ICD-10-CM

## 2025-06-20 PROCEDURE — 99999 PR PBB SHADOW E&M-EST. PATIENT-LVL IV: CPT | Mod: PBBFAC,,, | Performed by: NURSE PRACTITIONER

## 2025-06-20 RX ORDER — TIRZEPATIDE 7.5 MG/.5ML
7.5 INJECTION, SOLUTION SUBCUTANEOUS
Qty: 4 PEN | Refills: 1 | Status: SHIPPED | OUTPATIENT
Start: 2025-06-20

## 2025-06-20 NOTE — PROGRESS NOTES
"Subjective:       Patient ID: Gallito Wayne is a 45 y.o. male.    Chief Complaint: Follow-up (8w)        HPI WITH ASSESSMENT AND PLAN OF CARE:        Patient is a 45-year-old white male with psoriasis, prediabetes, chronic GERD, history of colon polyp and obesity with a history of a normal left heart catheterization in 2023 head is here today for 2 month follow up.        Hypertension  Diagnosed 11/7/2024  Started Valsartan 80 mg daily  BP well controlled  6/20/2025 visit:  Patient has since lost 53 pounds since December 2024 on Zepbound  He has been OFF of BP medication for past 2 days because he had left in [place]  BP okay without medication  /78 Comment: not on med past 2 days  Pulse 80   Temp 98.4 °F (36.9 °C)   Ht 5' 8" (1.727 m)   Wt 96.3 kg (212 lb 6.6 oz)   SpO2 95%   BMI 32.30 kg/m²   STAY OFF medication   Recheck in 8 weeks.           History of Prediabetes   Hemoglobin A1c 5.7% in March 2023.  HgbA1C 5.2 now  Will work on lifestyle modifications- currently on Zepbound for weight loss  Will recheck in 1 year           Obesity   STARTED Zepbound on 12/17/2024 with starting weight 265 and BMI 40.3  6/20/2025 visit:   Current weight: 212 with BMI 32; loss of 53 pounds since start of medication  Currently taking Zepbound 7.5 mg weekly  Stay on same dose  Recheck in 8 weeks.       History of GERD  Reports chronic reflux for years.    Had atypical chest wall pain and had a left heart catheterization in 2023 in [place] that was normal and was advised to follow up with GI for an EGD.  Patient reports he changed to mostly a plant based diet in early 2024 and was able to get off of medication  Advised patient that if symptoms recur that requires him to treat with medication that he should have an EGD to rule out any Triplett's esophagus, hiatal hernia, H pylori, etc.  Patient verbalizes understanding and will message me if symptoms recur and I will place referral to GI  Reports no recent " "GI/GERD issues in past 6 months.        History of heart catheterization   Patient works in [place] 28 days on/28 days off  Had atypical chest wall pain in early 2023.  Chest pains would not resolve so he had a heart catheterization done in early 2023 and states that there was no blockages.        Psoriasis  Positive psoriasis plaques to elbows and knees   Uses topical steroids p.r.n.        Environmental and seasonal allergies  Rotates Zyrtec or Allegra daily   Uses Flonase as needed p.r.n.  Stable    History of Colon Polyp  Colonoscopy done 6/13/2025:  10 mm polyp - tubular adenoma  Repeat in 3 years.      Vitals:    06/20/25 1319 06/20/25 1353   BP: 128/88 112/78   BP Location: Right arm    Patient Position: Sitting    Pulse: 80    Temp: 98.4 °F (36.9 °C)    SpO2: 95%    Weight: 96.3 kg (212 lb 6.6 oz)    Height: 5' 8" (1.727 m)          Diagnoses this Encounter:         ICD-10-CM ICD-9-CM   1. Primary hypertension  I10 401.9   2. History of prediabetes  Z87.898 V12.29   3. Class 1 obesity due to excess calories with serious comorbidity and body mass index (BMI) of 32.0 to 32.9 in adult  E66.811 278.00    E66.09 V85.32    Z68.32    4. History of gastroesophageal reflux (GERD)  Z87.19 V12.79   5. History of percutaneous left heart catheterization  Z98.890 V15.1   6. Psoriasis  L40.9 696.1   7. Environmental and seasonal allergies  J30.89 477.8   8. History of colon polyps  Z86.0100 V12.72       Orders Placed This Encounter    tirzepatide, weight loss, (ZEPBOUND) 7.5 mg/0.5 mL PnIj        Follow up in about 8 weeks (around 8/15/2025) for medication follow up.     Patient's Medications   New Prescriptions    No medications on file   Previous Medications    ASCORBIC ACID, VITAMIN C, (VITAMIN C) 500 MG TABLET    Take 500 mg by mouth once daily.    CETIRIZINE (ZYRTEC) 10 MG TABLET    Take 10 mg by mouth once daily.    FEXOFENADINE (ALLEGRA) 180 MG TABLET    Take 180 mg by mouth once daily.    ROFLUMILAST (ZORYVE) " 0.3 % CREA    Apply to affected area once daily for psoriasis; can use in groin   Modified Medications    Modified Medication Previous Medication    TIRZEPATIDE, WEIGHT LOSS, (ZEPBOUND) 7.5 MG/0.5 ML PNIJ tirzepatide, weight loss, (ZEPBOUND) 7.5 mg/0.5 mL PnIj       Inject 7.5 mg into the skin every 7 days.    Inject 7.5 mg into the skin every 7 days.   Discontinued Medications    VALSARTAN (DIOVAN) 80 MG TABLET    Take 1 tablet (80 mg total) by mouth once daily.         Review of Systems   HENT: Negative.     Respiratory: Negative.     Cardiovascular: Negative.    Gastrointestinal: Negative.          Objective:        Physical Exam  Constitutional:       General: He is not in acute distress.     Appearance: Normal appearance. He is obese. He is not toxic-appearing or diaphoretic.      Comments: Body mass index is 32.3 kg/m².       Cardiovascular:      Rate and Rhythm: Normal rate and regular rhythm.      Heart sounds: Normal heart sounds.   Pulmonary:      Effort: No respiratory distress.      Breath sounds: Normal breath sounds.   Neurological:      Mental Status: He is alert and oriented to person, place, and time.   Psychiatric:         Mood and Affect: Mood normal.         Behavior: Behavior normal.             Past Medical History:   Diagnosis Date    Chest pain 01/04/2016    Class 2 obesity due to excess calories without serious comorbidity with body mass index (BMI) of 37.0 to 37.9 in adult 01/22/2015    Headache     History of colon polyps 06/20/2025    History of percutaneous left heart catheterization 2023    [place] [facility] showed no obstructions.    Hypertension     Psoriasis 08/18/2017       Past Surgical History:   Procedure Laterality Date    BREAST SURGERY      Hormonal growth    CARDIAC CATHETERIZATION  [place]    [facility] [place]ages noted.    COLONOSCOPY, SCREENING, LOW RISK PATIENT N/A 6/13/2025    Procedure: COLONOSCOPY, SCREENING, LOW RISK  PATIENT;  Surgeon: Ron Palacio MD;  Location: [facility];  Service: Gastroenterology;  Laterality: N/A;    REFRACTIVE SURGERY  2009       Family History   Problem Relation Name Age of Onset    Hypertension Mother Eloise Wayne     Diabetes Mother Eloise Wayne         type 2 diabete    Heart disease Mother Eloise Wayne         A-Fib , cryoablasion    Hyperlipidemia Mother Eloise Wayne     Early death Father Gennaro Wayne 44        Diabetic complications    Diabetes Father Gennaro Wayne         type 1 diabetes    Diabetes Sister 1     Cancer Paternal Grandfather Quincy Wayne         Lymphoma; prostate cancer at age 90    Prostate cancer Neg Hx      Kidney disease Neg Hx         Social History     Socioeconomic History    Marital status:    Tobacco Use    Smoking status: Never    Smokeless tobacco: Never   Vaping Use    Vaping status: Never Used   Substance and Sexual Activity    Alcohol use: Yes     Alcohol/week: 4.0 standard drinks of alcohol     Types: 4 Cans of beer per week     Comment: socially    Drug use: No    Sexual activity: Yes     Partners: Female     Birth control/protection: None     Social Drivers of Health     Financial Resource Strain: Low Risk  (6/20/2025)    Overall Financial Resource Strain (CARDIA)     Difficulty of Paying Living Expenses: Not hard at all   Food Insecurity: No Food Insecurity (6/20/2025)    Hunger Vital Sign     Worried About Running Out of Food in the Last Year: Never true     Ran Out of Food in the Last Year: Never true   Transportation Needs: No Transportation Needs (6/20/2025)    PRAPARE - Transportation     Lack of Transportation (Medical): No     Lack of Transportation (Non-Medical): No   Physical Activity: Insufficiently Active (6/20/2025)    Exercise Vital Sign     Days of Exercise per Week: 3 days     Minutes of Exercise per Session: 30 min   Stress: No Stress Concern Present (6/20/2025)    [employer]  - Occupational Stress Questionnaire     Feeling of Stress : Not at all   Housing Stability: Low Risk  (6/20/2025)    Housing Stability Vital Sign     Unable to Pay for Housing in the Last Year: No     Number of Times Moved in the Last Year: 0     Homeless in the Last Year: No

## 2025-06-27 ENCOUNTER — PATIENT MESSAGE (OUTPATIENT)
Dept: FAMILY MEDICINE | Facility: CLINIC | Age: 45
End: 2025-06-27
Payer: COMMERCIAL

## 2025-06-28 RX ORDER — VALSARTAN 40 MG/1
40 TABLET ORAL DAILY
Qty: 90 TABLET | Refills: 0 | Status: SHIPPED | OUTPATIENT
Start: 2025-06-28 | End: 2026-06-28

## 2025-08-15 ENCOUNTER — OFFICE VISIT (OUTPATIENT)
Dept: FAMILY MEDICINE | Facility: CLINIC | Age: 45
End: 2025-08-15
Payer: COMMERCIAL

## 2025-08-15 VITALS
WEIGHT: 217.5 LBS | HEIGHT: 68 IN | HEART RATE: 62 BPM | OXYGEN SATURATION: 99 % | TEMPERATURE: 99 F | BODY MASS INDEX: 32.96 KG/M2 | DIASTOLIC BLOOD PRESSURE: 68 MMHG | SYSTOLIC BLOOD PRESSURE: 110 MMHG

## 2025-08-15 DIAGNOSIS — L40.9 PSORIASIS: ICD-10-CM

## 2025-08-15 DIAGNOSIS — Z87.19 HISTORY OF GASTROESOPHAGEAL REFLUX (GERD): ICD-10-CM

## 2025-08-15 DIAGNOSIS — Z98.890: ICD-10-CM

## 2025-08-15 DIAGNOSIS — I10 PRIMARY HYPERTENSION: ICD-10-CM

## 2025-08-15 DIAGNOSIS — E66.09 CLASS 1 OBESITY DUE TO EXCESS CALORIES WITH SERIOUS COMORBIDITY AND BODY MASS INDEX (BMI) OF 33.0 TO 33.9 IN ADULT: Primary | ICD-10-CM

## 2025-08-15 DIAGNOSIS — Z87.898 HISTORY OF PREDIABETES: ICD-10-CM

## 2025-08-15 DIAGNOSIS — J30.89 ENVIRONMENTAL AND SEASONAL ALLERGIES: ICD-10-CM

## 2025-08-15 DIAGNOSIS — E66.811 CLASS 1 OBESITY DUE TO EXCESS CALORIES WITH SERIOUS COMORBIDITY AND BODY MASS INDEX (BMI) OF 33.0 TO 33.9 IN ADULT: Primary | ICD-10-CM

## 2025-08-15 PROCEDURE — 99999 PR PBB SHADOW E&M-EST. PATIENT-LVL III: CPT | Mod: PBBFAC,,, | Performed by: NURSE PRACTITIONER

## 2025-08-15 RX ORDER — TIRZEPATIDE 10 MG/.5ML
10 INJECTION, SOLUTION SUBCUTANEOUS
Qty: 2 ML | Refills: 1 | Status: SHIPPED | OUTPATIENT
Start: 2025-08-15